# Patient Record
Sex: FEMALE | Race: WHITE | NOT HISPANIC OR LATINO | Employment: OTHER | URBAN - METROPOLITAN AREA
[De-identification: names, ages, dates, MRNs, and addresses within clinical notes are randomized per-mention and may not be internally consistent; named-entity substitution may affect disease eponyms.]

---

## 2017-02-28 ENCOUNTER — RECORDS - HEALTHEAST (OUTPATIENT)
Dept: LAB | Facility: CLINIC | Age: 77
End: 2017-02-28

## 2017-02-28 LAB
CHOLEST SERPL-MCNC: 159 MG/DL
FASTING STATUS PATIENT QL REPORTED: ABNORMAL
HDLC SERPL-MCNC: 45 MG/DL
LDLC SERPL CALC-MCNC: 77 MG/DL
TRIGL SERPL-MCNC: 187 MG/DL

## 2017-05-30 ENCOUNTER — HOSPITAL ENCOUNTER (OUTPATIENT)
Dept: LAB | Age: 77
Setting detail: SPECIMEN
Discharge: HOME | End: 2017-05-30

## 2017-08-31 ENCOUNTER — RECORDS - HEALTHEAST (OUTPATIENT)
Dept: LAB | Facility: CLINIC | Age: 77
End: 2017-08-31

## 2017-09-01 LAB
CHOLEST SERPL-MCNC: 159 MG/DL
FASTING STATUS PATIENT QL REPORTED: ABNORMAL
HDLC SERPL-MCNC: 46 MG/DL
LDLC SERPL CALC-MCNC: 95 MG/DL
TRIGL SERPL-MCNC: 90 MG/DL

## 2017-12-06 ENCOUNTER — RECORDS - HEALTHEAST (OUTPATIENT)
Dept: LAB | Facility: CLINIC | Age: 77
End: 2017-12-06

## 2017-12-06 LAB
CHOLEST SERPL-MCNC: 168 MG/DL
FASTING STATUS PATIENT QL REPORTED: ABNORMAL
HDLC SERPL-MCNC: 54 MG/DL
LDLC SERPL CALC-MCNC: 82 MG/DL
TRIGL SERPL-MCNC: 160 MG/DL

## 2018-03-07 ENCOUNTER — RECORDS - HEALTHEAST (OUTPATIENT)
Dept: LAB | Facility: CLINIC | Age: 78
End: 2018-03-07

## 2018-03-07 LAB
ALBUMIN SERPL-MCNC: 3.3 G/DL (ref 3.5–5)
ALP SERPL-CCNC: 122 U/L (ref 45–120)
ALT SERPL W P-5'-P-CCNC: 31 U/L (ref 0–45)
ANION GAP SERPL CALCULATED.3IONS-SCNC: 9 MMOL/L (ref 5–18)
AST SERPL W P-5'-P-CCNC: 38 U/L (ref 0–40)
BILIRUB SERPL-MCNC: 0.5 MG/DL (ref 0–1)
BUN SERPL-MCNC: 15 MG/DL (ref 8–28)
CALCIUM SERPL-MCNC: 9 MG/DL (ref 8.5–10.5)
CHLORIDE BLD-SCNC: 101 MMOL/L (ref 98–107)
CO2 SERPL-SCNC: 29 MMOL/L (ref 22–31)
CREAT SERPL-MCNC: 0.84 MG/DL (ref 0.6–1.1)
GFR SERPL CREATININE-BSD FRML MDRD: >60 ML/MIN/1.73M2
GLUCOSE BLD-MCNC: 218 MG/DL (ref 70–125)
POTASSIUM BLD-SCNC: 4.2 MMOL/L (ref 3.5–5)
PROT SERPL-MCNC: 7 G/DL (ref 6–8)
SODIUM SERPL-SCNC: 139 MMOL/L (ref 136–145)

## 2018-06-08 ENCOUNTER — RECORDS - HEALTHEAST (OUTPATIENT)
Dept: LAB | Facility: CLINIC | Age: 78
End: 2018-06-08

## 2018-06-08 LAB
ANION GAP SERPL CALCULATED.3IONS-SCNC: 12 MMOL/L (ref 5–18)
BUN SERPL-MCNC: 16 MG/DL (ref 8–28)
CALCIUM SERPL-MCNC: 9.3 MG/DL (ref 8.5–10.5)
CHLORIDE BLD-SCNC: 104 MMOL/L (ref 98–107)
CHOLEST SERPL-MCNC: 175 MG/DL
CO2 SERPL-SCNC: 27 MMOL/L (ref 22–31)
CREAT SERPL-MCNC: 0.84 MG/DL (ref 0.6–1.1)
FASTING STATUS PATIENT QL REPORTED: NORMAL
GFR SERPL CREATININE-BSD FRML MDRD: >60 ML/MIN/1.73M2
GLUCOSE BLD-MCNC: 171 MG/DL (ref 70–125)
HDLC SERPL-MCNC: 50 MG/DL
LDLC SERPL CALC-MCNC: 96 MG/DL
POTASSIUM BLD-SCNC: 4.2 MMOL/L (ref 3.5–5)
SODIUM SERPL-SCNC: 143 MMOL/L (ref 136–145)
TRIGL SERPL-MCNC: 145 MG/DL

## 2018-09-11 ENCOUNTER — RECORDS - HEALTHEAST (OUTPATIENT)
Dept: LAB | Facility: CLINIC | Age: 78
End: 2018-09-11

## 2018-09-11 LAB
ALBUMIN SERPL-MCNC: 3.5 G/DL (ref 3.5–5)
ALP SERPL-CCNC: 120 U/L (ref 45–120)
ALT SERPL W P-5'-P-CCNC: 21 U/L (ref 0–45)
ANION GAP SERPL CALCULATED.3IONS-SCNC: 11 MMOL/L (ref 5–18)
AST SERPL W P-5'-P-CCNC: 25 U/L (ref 0–40)
BILIRUB SERPL-MCNC: 0.4 MG/DL (ref 0–1)
BUN SERPL-MCNC: 14 MG/DL (ref 8–28)
CALCIUM SERPL-MCNC: 9.2 MG/DL (ref 8.5–10.5)
CHLORIDE BLD-SCNC: 103 MMOL/L (ref 98–107)
CHOLEST SERPL-MCNC: 160 MG/DL
CO2 SERPL-SCNC: 26 MMOL/L (ref 22–31)
CREAT SERPL-MCNC: 0.84 MG/DL (ref 0.6–1.1)
FASTING STATUS PATIENT QL REPORTED: ABNORMAL
GFR SERPL CREATININE-BSD FRML MDRD: >60 ML/MIN/1.73M2
GLUCOSE BLD-MCNC: 151 MG/DL (ref 70–125)
HDLC SERPL-MCNC: 47 MG/DL
LDLC SERPL CALC-MCNC: 85 MG/DL
POTASSIUM BLD-SCNC: 4.1 MMOL/L (ref 3.5–5)
PROT SERPL-MCNC: 6.9 G/DL (ref 6–8)
SODIUM SERPL-SCNC: 140 MMOL/L (ref 136–145)
T4 FREE SERPL-MCNC: 1 NG/DL (ref 0.7–1.8)
TRIGL SERPL-MCNC: 140 MG/DL
TSH SERPL DL<=0.005 MIU/L-ACNC: 5.59 UIU/ML (ref 0.3–5)
URATE SERPL-MCNC: 5.2 MG/DL (ref 2–7.5)

## 2018-12-14 ENCOUNTER — RECORDS - HEALTHEAST (OUTPATIENT)
Dept: LAB | Facility: CLINIC | Age: 78
End: 2018-12-14

## 2018-12-14 LAB
ALBUMIN SERPL-MCNC: 3.5 G/DL (ref 3.5–5)
ALP SERPL-CCNC: 125 U/L (ref 45–120)
ALT SERPL W P-5'-P-CCNC: 20 U/L (ref 0–45)
ANION GAP SERPL CALCULATED.3IONS-SCNC: 11 MMOL/L (ref 5–18)
AST SERPL W P-5'-P-CCNC: 23 U/L (ref 0–40)
BILIRUB SERPL-MCNC: 0.6 MG/DL (ref 0–1)
BUN SERPL-MCNC: 12 MG/DL (ref 8–28)
CALCIUM SERPL-MCNC: 9.5 MG/DL (ref 8.5–10.5)
CHLORIDE BLD-SCNC: 101 MMOL/L (ref 98–107)
CHOLEST SERPL-MCNC: 166 MG/DL
CO2 SERPL-SCNC: 30 MMOL/L (ref 22–31)
CREAT SERPL-MCNC: 0.84 MG/DL (ref 0.6–1.1)
FASTING STATUS PATIENT QL REPORTED: ABNORMAL
GFR SERPL CREATININE-BSD FRML MDRD: >60 ML/MIN/1.73M2
GLUCOSE BLD-MCNC: 156 MG/DL (ref 70–125)
HDLC SERPL-MCNC: 50 MG/DL
LDLC SERPL CALC-MCNC: 85 MG/DL
POTASSIUM BLD-SCNC: 4.2 MMOL/L (ref 3.5–5)
PROT SERPL-MCNC: 7.1 G/DL (ref 6–8)
SODIUM SERPL-SCNC: 142 MMOL/L (ref 136–145)
TRIGL SERPL-MCNC: 156 MG/DL
TSH SERPL DL<=0.005 MIU/L-ACNC: 4.4 UIU/ML (ref 0.3–5)

## 2019-03-19 ENCOUNTER — RECORDS - HEALTHEAST (OUTPATIENT)
Dept: LAB | Facility: CLINIC | Age: 79
End: 2019-03-19

## 2019-03-19 LAB
ALBUMIN SERPL-MCNC: 3.4 G/DL (ref 3.5–5)
ALP SERPL-CCNC: 117 U/L (ref 45–120)
ALT SERPL W P-5'-P-CCNC: 22 U/L (ref 0–45)
ANION GAP SERPL CALCULATED.3IONS-SCNC: 10 MMOL/L (ref 5–18)
AST SERPL W P-5'-P-CCNC: 27 U/L (ref 0–40)
BILIRUB SERPL-MCNC: 0.5 MG/DL (ref 0–1)
BUN SERPL-MCNC: 14 MG/DL (ref 8–28)
CALCIUM SERPL-MCNC: 9.2 MG/DL (ref 8.5–10.5)
CHLORIDE BLD-SCNC: 103 MMOL/L (ref 98–107)
CO2 SERPL-SCNC: 29 MMOL/L (ref 22–31)
CREAT SERPL-MCNC: 0.79 MG/DL (ref 0.6–1.1)
GFR SERPL CREATININE-BSD FRML MDRD: >60 ML/MIN/1.73M2
GLUCOSE BLD-MCNC: 147 MG/DL (ref 70–125)
POTASSIUM BLD-SCNC: 4.3 MMOL/L (ref 3.5–5)
PROT SERPL-MCNC: 7 G/DL (ref 6–8)
SODIUM SERPL-SCNC: 142 MMOL/L (ref 136–145)
URATE SERPL-MCNC: 3.9 MG/DL (ref 2–7.5)

## 2019-06-20 ENCOUNTER — RECORDS - HEALTHEAST (OUTPATIENT)
Dept: LAB | Facility: CLINIC | Age: 79
End: 2019-06-20

## 2019-06-20 LAB
ALBUMIN SERPL-MCNC: 3.3 G/DL (ref 3.5–5)
ALP SERPL-CCNC: 120 U/L (ref 45–120)
ALT SERPL W P-5'-P-CCNC: 24 U/L (ref 0–45)
ANION GAP SERPL CALCULATED.3IONS-SCNC: 10 MMOL/L (ref 5–18)
AST SERPL W P-5'-P-CCNC: 25 U/L (ref 0–40)
BILIRUB SERPL-MCNC: 0.3 MG/DL (ref 0–1)
BUN SERPL-MCNC: 16 MG/DL (ref 8–28)
CALCIUM SERPL-MCNC: 9.3 MG/DL (ref 8.5–10.5)
CHLORIDE BLD-SCNC: 103 MMOL/L (ref 98–107)
CHOLEST SERPL-MCNC: 158 MG/DL
CO2 SERPL-SCNC: 26 MMOL/L (ref 22–31)
CREAT SERPL-MCNC: 0.87 MG/DL (ref 0.6–1.1)
FASTING STATUS PATIENT QL REPORTED: NORMAL
GFR SERPL CREATININE-BSD FRML MDRD: >60 ML/MIN/1.73M2
GLUCOSE BLD-MCNC: 222 MG/DL (ref 70–125)
HDLC SERPL-MCNC: 51 MG/DL
LDLC SERPL CALC-MCNC: 78 MG/DL
POTASSIUM BLD-SCNC: 4.2 MMOL/L (ref 3.5–5)
PROT SERPL-MCNC: 6.8 G/DL (ref 6–8)
SODIUM SERPL-SCNC: 139 MMOL/L (ref 136–145)
TRIGL SERPL-MCNC: 143 MG/DL
TSH SERPL DL<=0.005 MIU/L-ACNC: 3.6 UIU/ML (ref 0.3–5)

## 2019-09-24 ENCOUNTER — RECORDS - HEALTHEAST (OUTPATIENT)
Dept: LAB | Facility: CLINIC | Age: 79
End: 2019-09-24

## 2019-09-24 LAB
ALBUMIN SERPL-MCNC: 3.3 G/DL (ref 3.5–5)
ALP SERPL-CCNC: 114 U/L (ref 45–120)
ALT SERPL W P-5'-P-CCNC: 23 U/L (ref 0–45)
ANION GAP SERPL CALCULATED.3IONS-SCNC: 9 MMOL/L (ref 5–18)
AST SERPL W P-5'-P-CCNC: 24 U/L (ref 0–40)
BILIRUB SERPL-MCNC: 0.4 MG/DL (ref 0–1)
BUN SERPL-MCNC: 14 MG/DL (ref 8–28)
CALCIUM SERPL-MCNC: 8.8 MG/DL (ref 8.5–10.5)
CHLORIDE BLD-SCNC: 104 MMOL/L (ref 98–107)
CHOLEST SERPL-MCNC: 152 MG/DL
CO2 SERPL-SCNC: 28 MMOL/L (ref 22–31)
CREAT SERPL-MCNC: 0.88 MG/DL (ref 0.6–1.1)
FASTING STATUS PATIENT QL REPORTED: ABNORMAL
GFR SERPL CREATININE-BSD FRML MDRD: >60 ML/MIN/1.73M2
GLUCOSE BLD-MCNC: 215 MG/DL (ref 70–125)
HDLC SERPL-MCNC: 52 MG/DL
LDLC SERPL CALC-MCNC: 68 MG/DL
POTASSIUM BLD-SCNC: 4 MMOL/L (ref 3.5–5)
PROT SERPL-MCNC: 6.7 G/DL (ref 6–8)
SODIUM SERPL-SCNC: 141 MMOL/L (ref 136–145)
T4 FREE SERPL-MCNC: 0.9 NG/DL (ref 0.7–1.8)
TRIGL SERPL-MCNC: 160 MG/DL
TSH SERPL DL<=0.005 MIU/L-ACNC: 7.52 UIU/ML (ref 0.3–5)

## 2020-01-02 ENCOUNTER — RECORDS - HEALTHEAST (OUTPATIENT)
Dept: LAB | Facility: CLINIC | Age: 80
End: 2020-01-02

## 2020-01-02 LAB
ALBUMIN SERPL-MCNC: 3.4 G/DL (ref 3.5–5)
ALP SERPL-CCNC: 126 U/L (ref 45–120)
ALT SERPL W P-5'-P-CCNC: 26 U/L (ref 0–45)
ANION GAP SERPL CALCULATED.3IONS-SCNC: 12 MMOL/L (ref 5–18)
AST SERPL W P-5'-P-CCNC: 32 U/L (ref 0–40)
BILIRUB SERPL-MCNC: 0.6 MG/DL (ref 0–1)
BUN SERPL-MCNC: 16 MG/DL (ref 8–28)
CALCIUM SERPL-MCNC: 9.3 MG/DL (ref 8.5–10.5)
CHLORIDE BLD-SCNC: 102 MMOL/L (ref 98–107)
CHOLEST SERPL-MCNC: 169 MG/DL
CO2 SERPL-SCNC: 26 MMOL/L (ref 22–31)
CREAT SERPL-MCNC: 1.02 MG/DL (ref 0.6–1.1)
FASTING STATUS PATIENT QL REPORTED: ABNORMAL
GFR SERPL CREATININE-BSD FRML MDRD: 53 ML/MIN/1.73M2
GLUCOSE BLD-MCNC: 210 MG/DL (ref 70–125)
HDLC SERPL-MCNC: 46 MG/DL
LDLC SERPL CALC-MCNC: 72 MG/DL
POTASSIUM BLD-SCNC: 4.1 MMOL/L (ref 3.5–5)
PROT SERPL-MCNC: 7.2 G/DL (ref 6–8)
SODIUM SERPL-SCNC: 140 MMOL/L (ref 136–145)
T4 FREE SERPL-MCNC: 1 NG/DL (ref 0.7–1.8)
TRIGL SERPL-MCNC: 255 MG/DL
TSH SERPL DL<=0.005 MIU/L-ACNC: 5.11 UIU/ML (ref 0.3–5)

## 2020-04-03 ENCOUNTER — RECORDS - HEALTHEAST (OUTPATIENT)
Dept: LAB | Facility: CLINIC | Age: 80
End: 2020-04-03

## 2020-04-03 LAB
ANION GAP SERPL CALCULATED.3IONS-SCNC: 11 MMOL/L (ref 5–18)
BUN SERPL-MCNC: 13 MG/DL (ref 8–28)
CALCIUM SERPL-MCNC: 8.7 MG/DL (ref 8.5–10.5)
CHLORIDE BLD-SCNC: 101 MMOL/L (ref 98–107)
CO2 SERPL-SCNC: 28 MMOL/L (ref 22–31)
CREAT SERPL-MCNC: 1.03 MG/DL (ref 0.6–1.1)
GFR SERPL CREATININE-BSD FRML MDRD: 52 ML/MIN/1.73M2
GLUCOSE BLD-MCNC: 275 MG/DL (ref 70–125)
POTASSIUM BLD-SCNC: 4 MMOL/L (ref 3.5–5)
SODIUM SERPL-SCNC: 140 MMOL/L (ref 136–145)
T4 FREE SERPL-MCNC: 1 NG/DL (ref 0.7–1.8)
TSH SERPL DL<=0.005 MIU/L-ACNC: 6.16 UIU/ML (ref 0.3–5)

## 2020-04-04 LAB — BACTERIA SPEC CULT: NO GROWTH

## 2020-06-04 ENCOUNTER — RECORDS - HEALTHEAST (OUTPATIENT)
Dept: LAB | Facility: CLINIC | Age: 80
End: 2020-06-04

## 2020-06-04 LAB
ANION GAP SERPL CALCULATED.3IONS-SCNC: 12 MMOL/L (ref 5–18)
BUN SERPL-MCNC: 16 MG/DL (ref 8–28)
CALCIUM SERPL-MCNC: 9 MG/DL (ref 8.5–10.5)
CHLORIDE BLD-SCNC: 105 MMOL/L (ref 98–107)
CO2 SERPL-SCNC: 24 MMOL/L (ref 22–31)
CREAT SERPL-MCNC: 0.94 MG/DL (ref 0.6–1.1)
GFR SERPL CREATININE-BSD FRML MDRD: 58 ML/MIN/1.73M2
GLUCOSE BLD-MCNC: 150 MG/DL (ref 70–125)
POTASSIUM BLD-SCNC: 4.4 MMOL/L (ref 3.5–5)
SODIUM SERPL-SCNC: 141 MMOL/L (ref 136–145)
TSH SERPL DL<=0.005 MIU/L-ACNC: 2.12 UIU/ML (ref 0.3–5)

## 2020-12-15 ENCOUNTER — VIRTUAL VISIT (OUTPATIENT)
Dept: NEUROLOGY | Facility: CLINIC | Age: 80
End: 2020-12-15
Payer: MEDICARE

## 2020-12-15 VITALS — BODY MASS INDEX: 30.73 KG/M2 | HEIGHT: 64 IN | WEIGHT: 180 LBS

## 2020-12-15 DIAGNOSIS — G40.909 SEIZURE DISORDER (H): Primary | ICD-10-CM

## 2020-12-15 PROCEDURE — 99442 PR PHYSICIAN TELEPHONE EVALUATION 11-20 MIN: CPT | Performed by: PSYCHIATRY & NEUROLOGY

## 2020-12-15 RX ORDER — CLINDAMYCIN PHOSPHATE 10 UG/ML
LOTION TOPICAL DAILY PRN
COMMUNITY
Start: 2020-08-05

## 2020-12-15 RX ORDER — ALLOPURINOL 100 MG/1
1 TABLET ORAL 2 TIMES DAILY
COMMUNITY
Start: 2020-10-26

## 2020-12-15 RX ORDER — LAMOTRIGINE 100 MG/1
100 TABLET ORAL 2 TIMES DAILY
Qty: 180 TABLET | Refills: 3 | Status: SHIPPED | OUTPATIENT
Start: 2020-12-15 | End: 2021-12-13

## 2020-12-15 RX ORDER — ATORVASTATIN CALCIUM 40 MG/1
40 TABLET, FILM COATED ORAL AT BEDTIME
COMMUNITY
Start: 2020-09-03

## 2020-12-15 RX ORDER — LAMOTRIGINE 100 MG/1
1 TABLET ORAL 2 TIMES DAILY
COMMUNITY
Start: 2020-08-17 | End: 2020-12-15

## 2020-12-15 RX ORDER — LEVOTHYROXINE SODIUM 75 UG/1
75 TABLET ORAL DAILY
COMMUNITY
Start: 2020-05-01 | End: 2023-05-10

## 2020-12-15 RX ORDER — TRIAMCINOLONE ACETONIDE 1 MG/G
CREAM TOPICAL PRN
COMMUNITY
Start: 2020-06-15 | End: 2023-05-10

## 2020-12-15 RX ORDER — HYDROCHLOROTHIAZIDE 25 MG/1
12.5 TABLET ORAL DAILY
COMMUNITY
Start: 2020-10-17

## 2020-12-15 RX ORDER — VALSARTAN 160 MG/1
1 TABLET ORAL DAILY
COMMUNITY
Start: 2020-12-01 | End: 2023-05-10

## 2020-12-15 RX ORDER — INSULIN GLARGINE 100 [IU]/ML
30 INJECTION, SOLUTION SUBCUTANEOUS AT BEDTIME
COMMUNITY
Start: 2020-08-28 | End: 2024-08-13 | Stop reason: ALTCHOICE

## 2020-12-15 SDOH — HEALTH STABILITY: MENTAL HEALTH: HOW OFTEN DO YOU HAVE A DRINK CONTAINING ALCOHOL?: NEVER

## 2020-12-15 ASSESSMENT — MIFFLIN-ST. JEOR: SCORE: 1281.47

## 2020-12-15 NOTE — LETTER
12/15/2020         RE: Anjelica Rajan  [address]  [place] [address]        Dear Colleague,    Thank you for referring your patient, Anjelica Rajan, to the [facility] [place]. Please see a copy of my visit note below.    [facility]  [place]    Anjelica Rajan MRN# [id number]   Age: 80 year old YOB: 1940               Assessment and Plan:   Assessment:   Right temporal lobe epilepsy        Plan:     I renewed the lamotrigine through her mail order pharmacy electronically.  We will plan to meet again in a year, sooner if need be.             Chief Complaint/HPI:     I spoke to her niece on the telephone for a telephone visit with her permission today.  She is 80 now, she has a history of seizures coming from the right temporal lobe.  I think it is been at least 10 years since she had a seizure (episodes of left-sided numbness).  She continues on lamotrigine 100 mg twice a day, no side effects from that.  She does mention that it might make her a little more mellow, but it is not causing the fatigue that she had with Keppra previously.            Past Medical History:    has a past medical history of Seizures (H).          Past Surgical History:    has no past surgical history on file.          Social History:     Social History     Tobacco Use     Smoking status: Never Smoker     Smokeless tobacco: Never Used   Substance Use Topics     Alcohol use: Never     Frequency: Never             Family History:     Family History   Problem Relation Age of Onset     Heart Disease Father                 Allergies:     Allergies   Allergen Reactions     Metformin Diarrhea             Medications:     Current Outpatient Medications:      allopurinol (ZYLOPRIM) 100 MG tablet, Take 1 tablet by mouth 2 times daily, Disp: , Rfl:      ASPIRIN 81 PO, Take by mouth At Bedtime, Disp: , Rfl:      atorvastatin (LIPITOR) 40 MG tablet, Take 1 tablet by mouth daily, Disp:  , Rfl:      CALCIUM-VITAMIN D PO, Take by mouth daily, Disp: , Rfl:      clindamycin (CLEOCIN T) 1 % external lotion, as needed, Disp: , Rfl:      DIOVAN 160 MG tablet, Take 1 tablet by mouth daily, Disp: , Rfl:      GLUCOSAMINE-CHONDROITIN PO, Take by mouth daily, Disp: , Rfl:      hydrochlorothiazide (HYDRODIURIL) 25 MG tablet, Take 12.5 mg by mouth daily, Disp: , Rfl:      lamoTRIgine (LAMICTAL) 100 MG tablet, Take 1 tablet (100 mg) by mouth 2 times daily, Disp: 180 tablet, Rfl: 3     LANTUS SOLOSTAR 100 UNIT/ML soln, , Disp: , Rfl:      levothyroxine (SYNTHROID/LEVOTHROID) 75 MCG tablet, Take 75 mcg by mouth daily, Disp: , Rfl:      MULTIPLE VITAMIN PO, Take by mouth daily, Disp: , Rfl:      omeprazole (PRILOSEC) 20 MG DR capsule, Take 1 capsule by mouth daily, Disp: , Rfl:      triamcinolone (KENALOG) 0.1 % external cream, as needed, Disp: , Rfl:            Review of Systems:   No difficulty breathing or swallowing            Physical Exam:   Awake and alert with no aphasia no dysarthria  Speech is clear and coherent  Cranial nerves are fine  I do not see any focal or lateralized weakness or coordination difficulties in the arms  Gait looks steady here on the video.        13 minutes were spent on the phone today      Lai Goodrich MD             Again, thank you for allowing me to participate in the care of your patient.        Sincerely,        Lai Goodrich MD

## 2020-12-15 NOTE — PATIENT INSTRUCTIONS
Patient Education     Diagnosing Epilepsy    Your primary healthcare provider may be the first healthcare provider to evaluate you for epilepsy. He or she may then refer you to a specialist for further evaluation. This specialist may be a neurologist (a healthcare provider who treats the brain), or an epileptologist, a neurologist who specializes in seizure disorders. Your evaluation will include a health history, physical and neurologic exams, and tests. An epilepsy diagnosis is typically made when someone has more than 1 seizure that occurs for no apparent reason (an unprovoked seizure).  Health history  This is the most important part of your evaluation. The healthcare provider will ask you to describe your seizures. The healthcare provider may also want to talk to family or friends who have seen your seizures. In addition, your healthcare provider will ask about your risk factors. These are things that make you more likely to have epilepsy, and include:    Being born before your due date (premature birth)    Oxygen deprivation during birth     A family history of epilepsy    Past nervous system infection, like meningitis    A previous head or brain injury    Past stroke or brain tumor    A history of febrile seizures (childhood seizures caused by high fever)    Use of illegal drugs or alcohol    Certain genetic disorders    Alcohol abuse or withdrawal    Alzheimer disease    Gluten intolerance or celiac disease    Hydrocephalus or an abnormal buildup of fluid around the brain    Withdrawal of antiepileptic medicines, even when they are used for other conditions, such as gabapentin for pain   Physical and neurologic exams  The physical exam checks your overall health. Your pulse, blood pressure, and temperature are taken. The neurologic exam checks certain functions of your brain. These include reflexes, balance, muscle strength, and coordination. Mental skills, like language and memory, and nerve function of  the body are also checked.  Tests for epilepsy  After the exams are done, the healthcare provider may order some tests. Electroencephalogram (EEG) and MRI are the most common tests used to support a diagnosis of epilepsy.  Electroencephalogram (EEG)  An EEG records electrical activity in the brain. It can show abnormal signals that may mean seizure activity. In some cases, it can point to the area of the brain where seizures might start.  Imaging tests  Imaging tests may be used to create detailed pictures of the brain. These tests include MRI and CT scans.  Blood tests and other tests  You may have a sample of blood taken and tested. Other tests may also be done. These tests can help rule out certain health problems or provide more information.  [employer] last reviewed this educational content on 11/1/2017 2000-2020 [employer] [address] [place] [address], [place] [address]. All rights reserved. This information is not intended as a substitute for professional medical care. Always follow your healthcare professional's instructions.

## 2020-12-21 ENCOUNTER — RECORDS - HEALTHEAST (OUTPATIENT)
Dept: LAB | Facility: CLINIC | Age: 80
End: 2020-12-21

## 2020-12-21 LAB
ANION GAP SERPL CALCULATED.3IONS-SCNC: 11 MMOL/L (ref 5–18)
BUN SERPL-MCNC: 19 MG/DL (ref 8–28)
CALCIUM SERPL-MCNC: 9.1 MG/DL (ref 8.5–10.5)
CHLORIDE BLD-SCNC: 103 MMOL/L (ref 98–107)
CHOLEST SERPL-MCNC: 172 MG/DL
CO2 SERPL-SCNC: 28 MMOL/L (ref 22–31)
CREAT SERPL-MCNC: 0.85 MG/DL (ref 0.6–1.1)
FASTING STATUS PATIENT QL REPORTED: ABNORMAL
GFR SERPL CREATININE-BSD FRML MDRD: >60 ML/MIN/1.73M2
GLUCOSE BLD-MCNC: 195 MG/DL (ref 70–125)
HDLC SERPL-MCNC: 51 MG/DL
LDLC SERPL CALC-MCNC: 81 MG/DL
POTASSIUM BLD-SCNC: 4.7 MMOL/L (ref 3.5–5)
SODIUM SERPL-SCNC: 142 MMOL/L (ref 136–145)
TRIGL SERPL-MCNC: 198 MG/DL

## 2021-05-26 ENCOUNTER — RECORDS - HEALTHEAST (OUTPATIENT)
Dept: ADMINISTRATIVE | Facility: CLINIC | Age: 81
End: 2021-05-26

## 2021-05-31 ENCOUNTER — RECORDS - HEALTHEAST (OUTPATIENT)
Dept: ADMINISTRATIVE | Facility: CLINIC | Age: 81
End: 2021-05-31

## 2021-06-23 ENCOUNTER — RECORDS - HEALTHEAST (OUTPATIENT)
Dept: LAB | Facility: CLINIC | Age: 81
End: 2021-06-23

## 2021-06-23 LAB
ANION GAP SERPL CALCULATED.3IONS-SCNC: 14 MMOL/L (ref 5–18)
BUN SERPL-MCNC: 14 MG/DL (ref 8–28)
CALCIUM SERPL-MCNC: 8.8 MG/DL (ref 8.5–10.5)
CHLORIDE BLD-SCNC: 100 MMOL/L (ref 98–107)
CO2 SERPL-SCNC: 26 MMOL/L (ref 22–31)
CREAT SERPL-MCNC: 1.02 MG/DL (ref 0.6–1.1)
GFR SERPL CREATININE-BSD FRML MDRD: 52 ML/MIN/1.73M2
GLUCOSE BLD-MCNC: 206 MG/DL (ref 70–125)
POTASSIUM BLD-SCNC: 4.4 MMOL/L (ref 3.5–5)
SODIUM SERPL-SCNC: 140 MMOL/L (ref 136–145)
TSH SERPL DL<=0.005 MIU/L-ACNC: 4.61 UIU/ML (ref 0.3–5)

## 2021-06-24 LAB — BACTERIA SPEC CULT: NORMAL

## 2021-07-23 ENCOUNTER — RECORDS - HEALTHEAST (OUTPATIENT)
Dept: ADMINISTRATIVE | Facility: CLINIC | Age: 81
End: 2021-07-23

## 2021-09-27 ENCOUNTER — LAB REQUISITION (OUTPATIENT)
Dept: LAB | Facility: CLINIC | Age: 81
End: 2021-09-27
Payer: MEDICARE

## 2021-09-27 DIAGNOSIS — J02.9 ACUTE PHARYNGITIS, UNSPECIFIED: ICD-10-CM

## 2021-09-27 PROCEDURE — 87081 CULTURE SCREEN ONLY: CPT | Mod: ORL | Performed by: PHYSICIAN ASSISTANT

## 2021-09-30 LAB — BACTERIA SPEC CULT: NORMAL

## 2021-12-10 DIAGNOSIS — G40.909 SEIZURE DISORDER (H): ICD-10-CM

## 2021-12-13 RX ORDER — LAMOTRIGINE 100 MG/1
TABLET ORAL
Qty: 180 TABLET | Refills: 3 | Status: SHIPPED | OUTPATIENT
Start: 2021-12-13 | End: 2021-12-23

## 2021-12-13 NOTE — TELEPHONE ENCOUNTER
Medication refill request for lamoTRIgine (LAMICTAL) 100 MG tablet.    Patient's next appointment is scheduled on December 23, 2021.      Medication T'd for review and signature    ELAINE Ramirez on 12/13/2021 at 10:19 AM

## 2021-12-23 ENCOUNTER — OFFICE VISIT (OUTPATIENT)
Dept: NEUROLOGY | Facility: CLINIC | Age: 81
End: 2021-12-23
Payer: MEDICARE

## 2021-12-23 VITALS
BODY MASS INDEX: 34.49 KG/M2 | HEART RATE: 79 BPM | SYSTOLIC BLOOD PRESSURE: 161 MMHG | HEIGHT: 64 IN | DIASTOLIC BLOOD PRESSURE: 85 MMHG | WEIGHT: 202 LBS

## 2021-12-23 DIAGNOSIS — G40.109 SIMPLE PARTIAL SEIZURE DISORDER (H): Primary | ICD-10-CM

## 2021-12-23 PROBLEM — N18.31 CHRONIC KIDNEY DISEASE, STAGE 3A (H): Status: ACTIVE | Noted: 2021-12-23

## 2021-12-23 PROBLEM — E78.2 MIXED HYPERLIPIDEMIA: Status: ACTIVE | Noted: 2021-12-23

## 2021-12-23 PROBLEM — E11.9 TYPE 2 DIABETES MELLITUS WITHOUT COMPLICATION (H): Status: ACTIVE | Noted: 2021-12-23

## 2021-12-23 PROBLEM — Z85.820 H/O MALIGNANT MELANOMA: Status: ACTIVE | Noted: 2021-12-23

## 2021-12-23 PROBLEM — E03.9 ACQUIRED HYPOTHYROIDISM: Status: ACTIVE | Noted: 2021-12-23

## 2021-12-23 PROBLEM — G40.209 NONINTRACTABLE EPILEPSY WITH COMPLEX PARTIAL SEIZURES (H): Status: ACTIVE | Noted: 2021-12-23

## 2021-12-23 PROCEDURE — 99215 OFFICE O/P EST HI 40 MIN: CPT | Performed by: PSYCHIATRY & NEUROLOGY

## 2021-12-23 RX ORDER — LAMOTRIGINE 100 MG/1
100 TABLET ORAL 2 TIMES DAILY
Qty: 180 TABLET | Refills: 3 | Status: SHIPPED | OUTPATIENT
Start: 2021-12-23 | End: 2022-12-27

## 2021-12-23 ASSESSMENT — MIFFLIN-ST. JEOR: SCORE: 1376.27

## 2021-12-23 NOTE — LETTER
2021         RE: Anjelica Rajan  [address]  [place] [address]        Dear Colleague,    Thank you for referring your patient, Anjelica Rajan, to the [facility] [place]. Please see a copy of my visit note below.    NEUROLOGY FOLLOW UP VISIT  NOTE       [facility] [place]  [address] [place] [address]  Tel: [phone]  Fax: [phone]  [email or web address]     Anjelica Rajan,  1940, MRN [id number]  PCP: Chilo Betts  Date: 2021      ASSESSMENT & PLAN     Visit Diagnosis  1. Simple partial seizure     Simple partial seizure  81-year-old female with HTN, HLD, DM who is been followed in our clinic for simple partial seizure.  Previously she was on Keppra but ever since she was switched to lamotrigine her symptoms have remained well controlled on low-dose.  I have recommended:    1.  Continue lamotrigine 100 mg twice daily.  I refilled her prescription, gave her 90-day supply with 3 refills  2.  Check lamotrigine level and hepatic profile  3.  Follow-up will be in 1 year    Thank you again for this referral, please feel free to contact me if you have any questions.    Kevyn Otero MD  [facility]  (Formerly, [facility]     HISTORY OF PRESENT ILLNESS     Patient is a 81-year-old female with history of HTN, HLD, DM and simple partial seizures previously followed by Dr. Goodrich who returns for yearly follow-up.  She is a new patient for me.    Patient was initially evaluated in our clinic in  when she reported episodes of left sided weakness and facial numbness.  Initially she was worked up for transient ischemic attack and had MRI of the head and MRA that was normal.  However, EEG showed right temporal sharp activity and she was diagnosed with focal seizures.  She was initially on Keppra and subsequently switched to lamotrigine in .  This was done as he  was feeling fatigued on Keppra.  According to patient the seizures during this episode she never loses consciousness.  She gets numbness tingling on the left side.  She has been symptom-free for more than 6 years.     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    Surgical History:  She  has a past surgical history that includes IR Renal Angiogram Bilateral (11/26/2002).     SOCIAL HISTORY     Reviewed, and she  reports that she has never smoked. She has never used smokeless tobacco. She reports that she does not drink alcohol and does not use drugs.     FAMILY HISTORY     Reviewed, and family history includes Heart Disease in her father.     ALLERGIES     Allergies   Allergen Reactions     Glipizide      Other reaction(s): dizziness     Metformin Diarrhea         REVIEW OF SYSTEMS     A 12 point review of system was performed and was negative except as outlined in the history of present illness.     HOME MEDICATIONS     Current Outpatient Rx   Medication Sig Dispense Refill     allopurinol (ZYLOPRIM) 100 MG tablet Take 1 tablet by mouth 2 times daily       ASPIRIN 81 PO Take by mouth At Bedtime       atorvastatin (LIPITOR) 40 MG tablet Take 1 tablet by mouth daily       CALCIUM-VITAMIN D PO Take by mouth daily       clindamycin (CLEOCIN T) 1 % external lotion as needed       DIOVAN 160 MG tablet Take 1 tablet by mouth daily       GLUCOSAMINE-CHONDROITIN PO Take by mouth daily       hydrochlorothiazide (HYDRODIURIL) 25 MG tablet Take 12.5 mg by mouth daily       lamoTRIgine (LAMICTAL) 100 MG tablet  "Take 1 tablet (100 mg) by mouth 2 times daily 180 tablet 3     LANTUS SOLOSTAR 100 UNIT/ML soln        levothyroxine (SYNTHROID/LEVOTHROID) 75 MCG tablet Take 75 mcg by mouth daily       MULTIPLE VITAMIN PO Take by mouth daily       omeprazole (PRILOSEC) 20 MG DR capsule Take 1 capsule by mouth daily       triamcinolone (KENALOG) 0.1 % external cream as needed           PHYSICAL EXAM     Vital signs  BP (!) 161/85 (BP Location: Left arm, Patient Position: Sitting)   Pulse 79   Ht 1.626 m (5' 4\")   Wt 91.6 kg (202 lb)   BMI 34.67 kg/m      Weight:   202 lbs 0 oz    Patient is alert and oriented x4 in no acute distress. Vital signs were reviewed and are documented in electronic medical record. Neck was supple, no carotid bruits, thyromegaly, JVD, or lymphadenopathy was noted.   NEUROLOGY EXAM:    Patient s speech was normal with no aphasia or dysarthria. Mentation, and affect were also normal.     Funduscopic exam was normal, with normal cup to disc ratio. Cranial nerves II -XII were intact.     Patient had normal mass, tone and motor strength was 5/5 in all extremities without pronator drift.     Sensation was decreased to light touch pinprick below knees    Reflexes were 1+ upper extremity absent at knees and ankles    No dysmetria noted on FNF.    Gait testing was normal.     DIAGNOSTIC STUDIES     PERTINENT RADIOLOGY  Following imaging studies were reviewed:     MRI BRAIN 12/16/2008  1.  No acute infarcts, mass lesions or hemorrhage  2.  Empty sella  3.  Mild to moderate cerebellar atrophy  4.  Perivascular space versus less likely small chronic lacunar infarct in the anterior right basal ganglia.  5.  Head MRA negative except for vascular variance as noted above    EEG 7/6/2010  This is a mildly abnormal EEG due to some rare very brief episodes of slowing in the temporal lobes independently.  1 of these episode was a little more sharp on the right consistent with a seizure focus in the right temporal head " region    EEG 1/2/2009  This is an abnormal EEG due to intermittent bitemporal slowing and epileptiform discharges in the right temporal head region.  These findings are consistent with a seizure focus in the right temporal lobe, possibly also in the left.  The patient was contacted after this test was read and antiepileptic medications have been started     PERTINENT LABS  Following labs were reviewed:  Lab Requisition on 09/27/2021   Component Date Value     Culture 09/27/2021 No beta hemolytic Streptococcus isolated          Total time spent for face to face visit, reviewing labs/imaging studies, counseling and coordination of care was: 45 Minutes spent on the date of the encounter doing chart review, review of outside records, review of test results, interpretation of tests, patient visit and documentation       This note was dictated using voice recognition software.  Any grammatical or context distortions are unintentional and inherent to the software.    Orders Placed This Encounter   Procedures     Lamotrigine Level     Hepatic function panel      New Prescriptions    No medications on file     Modified Medications    Modified Medication Previous Medication    LAMOTRIGINE (LAMICTAL) 100 MG TABLET lamoTRIgine (LAMICTAL) 100 MG tablet       Take 1 tablet (100 mg) by mouth 2 times daily    TAKE 1 TABLET TWICE A DAY                     Again, thank you for allowing me to participate in the care of your patient.        Sincerely,        Kevyn Otero MD

## 2021-12-23 NOTE — PROGRESS NOTES
NEUROLOGY FOLLOW UP VISIT  NOTE       [facility] [place]  [address] [place] [address]  Tel: [phone]  Fax: [phone]  [email or web address]     Anjelica Rajan,  1940, MRN [id number]  PCP: Chilo Betts  Date: 2021      ASSESSMENT & PLAN     Visit Diagnosis  1. Simple partial seizure     Simple partial seizure  81-year-old female with HTN, HLD, DM who is been followed in our clinic for simple partial seizure.  Previously she was on Keppra but ever since she was switched to lamotrigine her symptoms have remained well controlled on low-dose.  I have recommended:    1.  Continue lamotrigine 100 mg twice daily.  I refilled her prescription, gave her 90-day supply with 3 refills  2.  Check lamotrigine level and hepatic profile  3.  Follow-up will be in 1 year    Thank you again for this referral, please feel free to contact me if you have any questions.    Kevyn Otero MD  [facility]  (Former[facility], [facility])     HISTORY OF PRESENT ILLNESS     Patient is a 81-year-old female with history of HTN, HLD, DM and simple partial seizures previously followed by Dr. Goodrich who returns for yearly follow-up.  She is a new patient for me.    Patient was initially evaluated in our clinic in  when she reported episodes of left sided weakness and facial numbness.  Initially she was worked up for transient ischemic attack and had MRI of the head and MRA that was normal.  However, EEG showed right temporal sharp activity and she was diagnosed with focal seizures.  She was initially on Keppra and subsequently switched to lamotrigine in .  This was done as he was feeling fatigued on Keppra.  According to patient the seizures during this episode she never loses consciousness.  She gets numbness tingling on the left side.  She has been symptom-free for more than 6 years.     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    Surgical History:  She  has a past surgical history that includes IR Renal Angiogram Bilateral (11/26/2002).     SOCIAL HISTORY     Reviewed, and she  reports that she has never smoked. She has never used smokeless tobacco. She reports that she does not drink alcohol and does not use drugs.     FAMILY HISTORY     Reviewed, and family history includes Heart Disease in her father.     ALLERGIES     Allergies   Allergen Reactions     Glipizide      Other reaction(s): dizziness     Metformin Diarrhea         REVIEW OF SYSTEMS     A 12 point review of system was performed and was negative except as outlined in the history of present illness.     HOME MEDICATIONS     Current Outpatient Rx   Medication Sig Dispense Refill     allopurinol (ZYLOPRIM) 100 MG tablet Take 1 tablet by mouth 2 times daily       ASPIRIN 81 PO Take by mouth At Bedtime       atorvastatin (LIPITOR) 40 MG tablet Take 1 tablet by mouth daily       CALCIUM-VITAMIN D PO Take by mouth daily       clindamycin (CLEOCIN T) 1 % external lotion as needed       DIOVAN 160 MG tablet Take 1 tablet by mouth daily       GLUCOSAMINE-CHONDROITIN PO Take by mouth daily       hydrochlorothiazide (HYDRODIURIL) 25 MG tablet Take 12.5 mg by mouth daily       lamoTRIgine (LAMICTAL) 100 MG tablet Take 1 tablet (100 mg) by mouth 2 times daily 180 tablet 3     LANTUS SOLOSTAR 100 UNIT/ML soln        levothyroxine (SYNTHROID/LEVOTHROID) 75 MCG tablet Take 75 mcg by mouth daily       MULTIPLE VITAMIN PO Take by mouth daily       omeprazole (PRILOSEC) 20  "MG DR capsule Take 1 capsule by mouth daily       triamcinolone (KENALOG) 0.1 % external cream as needed           PHYSICAL EXAM     Vital signs  BP (!) 161/85 (BP Location: Left arm, Patient Position: Sitting)   Pulse 79   Ht 1.626 m (5' 4\")   Wt 91.6 kg (202 lb)   BMI 34.67 kg/m      Weight:   202 lbs 0 oz    Patient is alert and oriented x4 in no acute distress. Vital signs were reviewed and are documented in electronic medical record. Neck was supple, no carotid bruits, thyromegaly, JVD, or lymphadenopathy was noted.   NEUROLOGY EXAM:    Patient s speech was normal with no aphasia or dysarthria. Mentation, and affect were also normal.     Funduscopic exam was normal, with normal cup to disc ratio. Cranial nerves II -XII were intact.     Patient had normal mass, tone and motor strength was 5/5 in all extremities without pronator drift.     Sensation was decreased to light touch pinprick below knees    Reflexes were 1+ upper extremity absent at knees and ankles    No dysmetria noted on FNF.    Gait testing was normal.     DIAGNOSTIC STUDIES     PERTINENT RADIOLOGY  Following imaging studies were reviewed:     MRI BRAIN 12/16/2008  1.  No acute infarcts, mass lesions or hemorrhage  2.  Empty sella  3.  Mild to moderate cerebellar atrophy  4.  Perivascular space versus less likely small chronic lacunar infarct in the anterior right basal ganglia.  5.  Head MRA negative except for vascular variance as noted above    EEG 7/6/2010  This is a mildly abnormal EEG due to some rare very brief episodes of slowing in the temporal lobes independently.  1 of these episode was a little more sharp on the right consistent with a seizure focus in the right temporal head region    EEG 1/2/2009  This is an abnormal EEG due to intermittent bitemporal slowing and epileptiform discharges in the right temporal head region.  These findings are consistent with a seizure focus in the right temporal lobe, possibly also in the left.  The " patient was contacted after this test was read and antiepileptic medications have been started     PERTINENT LABS  Following labs were reviewed:  Lab Requisition on 09/27/2021   Component Date Value     Culture 09/27/2021 No beta hemolytic Streptococcus isolated          Total time spent for face to face visit, reviewing labs/imaging studies, counseling and coordination of care was: 45 Minutes spent on the date of the encounter doing chart review, review of outside records, review of test results, interpretation of tests, patient visit and documentation       This note was dictated using voice recognition software.  Any grammatical or context distortions are unintentional and inherent to the software.    Orders Placed This Encounter   Procedures     Lamotrigine Level     Hepatic function panel      New Prescriptions    No medications on file     Modified Medications    Modified Medication Previous Medication    LAMOTRIGINE (LAMICTAL) 100 MG TABLET lamoTRIgine (LAMICTAL) 100 MG tablet       Take 1 tablet (100 mg) by mouth 2 times daily    TAKE 1 TABLET TWICE A DAY

## 2022-03-18 ENCOUNTER — LAB REQUISITION (OUTPATIENT)
Dept: LAB | Facility: CLINIC | Age: 82
End: 2022-03-18
Payer: MEDICARE

## 2022-03-18 DIAGNOSIS — I10 ESSENTIAL (PRIMARY) HYPERTENSION: ICD-10-CM

## 2022-03-18 LAB
ANION GAP SERPL CALCULATED.3IONS-SCNC: 15 MMOL/L (ref 5–18)
BUN SERPL-MCNC: 16 MG/DL (ref 8–28)
CALCIUM SERPL-MCNC: 9.5 MG/DL (ref 8.5–10.5)
CHLORIDE BLD-SCNC: 100 MMOL/L (ref 98–107)
CO2 SERPL-SCNC: 25 MMOL/L (ref 22–31)
CREAT SERPL-MCNC: 1.07 MG/DL (ref 0.6–1.1)
GFR SERPL CREATININE-BSD FRML MDRD: 53 ML/MIN/1.73M2
GLUCOSE BLD-MCNC: 273 MG/DL (ref 70–125)
POTASSIUM BLD-SCNC: 4.6 MMOL/L (ref 3.5–5)
SODIUM SERPL-SCNC: 140 MMOL/L (ref 136–145)

## 2022-03-18 PROCEDURE — 80048 BASIC METABOLIC PNL TOTAL CA: CPT | Mod: ORL | Performed by: PHYSICIAN ASSISTANT

## 2022-06-20 ENCOUNTER — LAB REQUISITION (OUTPATIENT)
Dept: LAB | Facility: CLINIC | Age: 82
End: 2022-06-20
Payer: MEDICARE

## 2022-06-20 DIAGNOSIS — E78.2 MIXED HYPERLIPIDEMIA: ICD-10-CM

## 2022-06-20 DIAGNOSIS — E03.9 HYPOTHYROIDISM, UNSPECIFIED: ICD-10-CM

## 2022-06-20 LAB
CHOLEST SERPL-MCNC: 158 MG/DL
FASTING STATUS PATIENT QL REPORTED: ABNORMAL
HDLC SERPL-MCNC: 45 MG/DL
LDLC SERPL CALC-MCNC: 82 MG/DL
TRIGL SERPL-MCNC: 156 MG/DL
TSH SERPL DL<=0.005 MIU/L-ACNC: 3.34 UIU/ML (ref 0.3–5)

## 2022-06-20 PROCEDURE — 84443 ASSAY THYROID STIM HORMONE: CPT | Mod: ORL | Performed by: PHYSICIAN ASSISTANT

## 2022-06-20 PROCEDURE — 80061 LIPID PANEL: CPT | Mod: ORL | Performed by: PHYSICIAN ASSISTANT

## 2022-09-20 ENCOUNTER — LAB REQUISITION (OUTPATIENT)
Dept: LAB | Facility: CLINIC | Age: 82
End: 2022-09-20
Payer: MEDICARE

## 2022-09-20 DIAGNOSIS — E11.65 TYPE 2 DIABETES MELLITUS WITH HYPERGLYCEMIA (H): ICD-10-CM

## 2022-09-20 LAB
ANION GAP SERPL CALCULATED.3IONS-SCNC: 12 MMOL/L (ref 7–15)
BUN SERPL-MCNC: 11.9 MG/DL (ref 8–23)
CALCIUM SERPL-MCNC: 8.6 MG/DL (ref 8.8–10.2)
CHLORIDE SERPL-SCNC: 102 MMOL/L (ref 98–107)
CREAT SERPL-MCNC: 0.85 MG/DL (ref 0.51–0.95)
DEPRECATED HCO3 PLAS-SCNC: 28 MMOL/L (ref 22–29)
GFR SERPL CREATININE-BSD FRML MDRD: 69 ML/MIN/1.73M2
GLUCOSE SERPL-MCNC: 161 MG/DL (ref 70–99)
POTASSIUM SERPL-SCNC: 3.7 MMOL/L (ref 3.4–5.3)
SODIUM SERPL-SCNC: 142 MMOL/L (ref 136–145)

## 2022-09-20 PROCEDURE — 80048 BASIC METABOLIC PNL TOTAL CA: CPT | Mod: ORL | Performed by: PHYSICIAN ASSISTANT

## 2022-12-13 ENCOUNTER — LAB REQUISITION (OUTPATIENT)
Dept: LAB | Facility: CLINIC | Age: 82
End: 2022-12-13
Payer: MEDICARE

## 2022-12-13 DIAGNOSIS — E11.65 TYPE 2 DIABETES MELLITUS WITH HYPERGLYCEMIA (H): ICD-10-CM

## 2022-12-13 LAB
ANION GAP SERPL CALCULATED.3IONS-SCNC: 15 MMOL/L (ref 7–15)
BUN SERPL-MCNC: 21.4 MG/DL (ref 8–23)
CALCIUM SERPL-MCNC: 9.1 MG/DL (ref 8.8–10.2)
CHLORIDE SERPL-SCNC: 101 MMOL/L (ref 98–107)
CREAT SERPL-MCNC: 1.18 MG/DL (ref 0.51–0.95)
DEPRECATED HCO3 PLAS-SCNC: 24 MMOL/L (ref 22–29)
GFR SERPL CREATININE-BSD FRML MDRD: 46 ML/MIN/1.73M2
GLUCOSE SERPL-MCNC: 176 MG/DL (ref 70–99)
POTASSIUM SERPL-SCNC: 4 MMOL/L (ref 3.4–5.3)
SODIUM SERPL-SCNC: 140 MMOL/L (ref 136–145)

## 2022-12-13 PROCEDURE — 80048 BASIC METABOLIC PNL TOTAL CA: CPT | Mod: ORL | Performed by: PHYSICIAN ASSISTANT

## 2022-12-27 ENCOUNTER — OFFICE VISIT (OUTPATIENT)
Dept: NEUROLOGY | Facility: CLINIC | Age: 82
End: 2022-12-27
Payer: MEDICARE

## 2022-12-27 VITALS
DIASTOLIC BLOOD PRESSURE: 72 MMHG | HEART RATE: 83 BPM | HEIGHT: 64 IN | WEIGHT: 190 LBS | BODY MASS INDEX: 32.44 KG/M2 | SYSTOLIC BLOOD PRESSURE: 142 MMHG

## 2022-12-27 DIAGNOSIS — G40.109 SIMPLE PARTIAL SEIZURE DISORDER (H): Primary | ICD-10-CM

## 2022-12-27 PROCEDURE — 99214 OFFICE O/P EST MOD 30 MIN: CPT | Performed by: PSYCHIATRY & NEUROLOGY

## 2022-12-27 RX ORDER — LAMOTRIGINE 100 MG/1
100 TABLET ORAL 2 TIMES DAILY
Qty: 180 TABLET | Refills: 3 | Status: SHIPPED | OUTPATIENT
Start: 2022-12-27 | End: 2023-12-27

## 2022-12-27 RX ORDER — EMPAGLIFLOZIN 10 MG/1
10 TABLET, FILM COATED ORAL DAILY
COMMUNITY
Start: 2022-12-02 | End: 2023-05-10

## 2022-12-27 NOTE — NURSING NOTE
Chief Complaint   Patient presents with     Seizures     Annual follow up     Sita Vaz CMA on 12/27/2022 at 10:38 AM

## 2022-12-27 NOTE — LETTER
2022         RE: Anjelica Rajan  [address]  [facility] [address]        Dear Colleague,    Thank you for referring your patient, Anjelica Rajan, to the [facility] [place]. Please see a copy of my visit note below.    NEUROLOGY FOLLOW UP VISIT  NOTE       [facility] [place]  [address] [place] [address]  Tel: [phone]  Fax: [phone]  [email or web address]     Anjelica Rajan,  1940, MRN [id number]  PCP: Chilo Betts  Date: 2022      ASSESSMENT & PLAN     Visit Diagnosis  1. Simple partial seizure disorder (H)     Simple partial seizure  82-year-old female with history of HTN, HLD, DM with simple partial seizures who returns for yearly follow-up.  Her symptoms are well controlled.  Previously she was on Keppra but had side effects but ever since she was switched to lamotrigine as she is doing well on a low-dose.  I have recommended:    1.  Continue lamotrigine 100 mg twice daily.  Prescriptions were filled  2.  Check comprehensive metabolic panel and lamotrigine level.  3.  Follow-up will be in 1 year    Thank you again for this referral, please feel free to contact me if you have any questions.    Kevyn Otero MD  [facility]  (Formerly, [facility]     HISTORY OF PRESENT ILLNESS     Patient is a 82-year-old male with history of HTN, HLD, DM with simple partial seizure who returns for yearly follow-up.  He was last seen on 2021 when he was continued on lamotrigine.  Previously he was on Keppra but after he was switched to lamotrigine his symptoms have remained well controlled.  I had ordered a lamotrigine level during his last visit that was not done.  She denies any episodes of focal seizures since her last saw her.  She has no specific complaints and wants her medication refill.  She is quite apologetic for not getting the lab work done last  year.    Patient was initially evaluated in our clinic in 2008 when she reported episodes of left sided weakness and facial numbness.  Initially she was worked up for transient ischemic attack and had MRI of the head and MRA that was normal.  However, EEG showed right temporal sharp activity and she was diagnosed with focal seizures.  She was initially on Keppra and subsequently switched to lamotrigine in 2015.  This was done as he was feeling fatigued on Keppra.  According to patient the seizures during this episode she never loses consciousness.  She gets numbness tingling on the left side.  She has been symptom-free for more than 6 years.     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    Surgical History:  She  has a past surgical history that includes IR Renal Angiogram Bilateral (11/26/2002).     SOCIAL HISTORY     Reviewed, and she  reports that she has never smoked. She has never used smokeless tobacco. She reports that she does not drink alcohol and does not use drugs.     FAMILY HISTORY     Reviewed, and family history includes Heart Disease in her father.     ALLERGIES     Allergies   Allergen Reactions     Glipizide      Other reaction(s): dizziness     Metformin Diarrhea         REVIEW OF SYSTEMS     A 12 point review of system was performed and was negative except as outlined in the history of present illness.     HOME MEDICATIONS     Current Outpatient Rx   Medication Sig Dispense Refill     allopurinol (ZYLOPRIM) 100 MG tablet Take 1 tablet by mouth 2 times daily       ASPIRIN 81 PO  "Take by mouth At Bedtime       atorvastatin (LIPITOR) 40 MG tablet Take 1 tablet by mouth daily       CALCIUM-VITAMIN D PO Take by mouth daily       clindamycin (CLEOCIN T) 1 % external lotion as needed       DIOVAN 160 MG tablet Take 1 tablet by mouth daily       GLUCOSAMINE-CHONDROITIN PO Take by mouth daily       hydrochlorothiazide (HYDRODIURIL) 25 MG tablet Take 12.5 mg by mouth daily       JARDIANCE 10 MG TABS tablet Take 10 mg by mouth daily       lamoTRIgine (LAMICTAL) 100 MG tablet Take 1 tablet (100 mg) by mouth 2 times daily 180 tablet 3     LANTUS SOLOSTAR 100 UNIT/ML soln        levothyroxine (SYNTHROID/LEVOTHROID) 75 MCG tablet Take 75 mcg by mouth daily       MULTIPLE VITAMIN PO Take by mouth daily       omeprazole (PRILOSEC) 20 MG DR capsule Take 1 capsule by mouth daily       triamcinolone (KENALOG) 0.1 % external cream as needed           PHYSICAL EXAM     Vital signs  BP (!) 142/72 (BP Location: Left arm, Patient Position: Sitting)   Pulse 83   Ht 1.626 m (5' 4\")   Wt 86.2 kg (190 lb)   BMI 32.61 kg/m      Weight:   190 lbs 0 oz    Patient is alert and oriented x4 in no acute distress. Vital signs were reviewed and are documented in electronic medical record. Neck was supple, no carotid bruits, thyromegaly, JVD, or lymphadenopathy was noted.   NEUROLOGY EXAM:    Patient s speech was normal with no aphasia or dysarthria. Mentation, and affect were also normal.     Funduscopic exam was normal, with normal cup to disc ratio. Cranial nerves II -XII were intact.  Except she is hard of hearing    Patient had normal mass, tone and motor strength was 5/5 in all extremities without pronator drift.     Sensation was decreased to light touch pinprick below knees    Reflexes were 1+ upper extremity absent at knees and ankles    No dysmetria noted on FNF.    Gait testing was normal.     PERTINENT DIAGNOSTIC STUDIES     Following studies were reviewed:     MRI BRAIN 12/16/2008  1.  No acute infarcts, mass " lesions or hemorrhage  2.  Empty sella  3.  Mild to moderate cerebellar atrophy  4.  Perivascular space versus less likely small chronic lacunar infarct in the anterior right basal ganglia.  5.  Head MRA negative except for vascular variance as noted above     EEG 7/6/2010  This is a mildly abnormal EEG due to some rare very brief episodes of slowing in the temporal lobes independently.  1 of these episode was a little more sharp on the right consistent with a seizure focus in the right temporal head region     EEG 1/2/2009  This is an abnormal EEG due to intermittent bitemporal slowing and epileptiform discharges in the right temporal head region.  These findings are consistent with a seizure focus in the right temporal lobe, possibly also in the left.  The patient was contacted after this test was read and antiepileptic medications have been started     PERTINENT LABS  Following labs were reviewed:  Lab Requisition on 12/13/2022   Component Date Value     Sodium 12/13/2022 140      Potassium 12/13/2022 4.0      Chloride 12/13/2022 101      Carbon Dioxide (CO2) 12/13/2022 24      Anion Gap 12/13/2022 15      Urea Nitrogen 12/13/2022 21.4      Creatinine 12/13/2022 1.18 (H)      Calcium 12/13/2022 9.1      Glucose 12/13/2022 176 (H)      GFR Estimate 12/13/2022 46 (L)          Total time spent for face to face visit, reviewing labs/imaging studies, counseling and coordination of care was: 30 Minutes spent on the date of the encounter doing chart review, review of outside records, review of test results, interpretation of tests, patient visit and documentation       This note was dictated using voice recognition software.  Any grammatical or context distortions are unintentional and inherent to the software.    Orders Placed This Encounter   Procedures     Lamotrigine Level     Comprehensive metabolic panel      New Prescriptions    No medications on file     Modified Medications    Modified Medication Previous  Medication    LAMOTRIGINE (LAMICTAL) 100 MG TABLET lamoTRIgine (LAMICTAL) 100 MG tablet       Take 1 tablet (100 mg) by mouth 2 times daily    Take 1 tablet (100 mg) by mouth 2 times daily                     Again, thank you for allowing me to participate in the care of your patient.        Sincerely,        Kevyn Otero MD

## 2022-12-27 NOTE — PROGRESS NOTES
NEUROLOGY FOLLOW UP VISIT  NOTE       [facility] [place]  [address] [place] [address]  Tel: [phone]  Fax: [phone]  [email or web address]     Anjelica Rajan,  1940, MRN [id number]  PCP: Chilo Betts  Date: 2022      ASSESSMENT & PLAN     Visit Diagnosis  1. Simple partial seizure disorder (H)     Simple partial seizure  82-year-old female with history of HTN, HLD, DM with simple partial seizures who returns for yearly follow-up.  Her symptoms are well controlled.  Previously she was on Keppra but had side effects but ever since she was switched to lamotrigine as she is doing well on a low-dose.  I have recommended:    1.  Continue lamotrigine 100 mg twice daily.  Prescriptions were filled  2.  Check comprehensive metabolic panel and lamotrigine level.  3.  Follow-up will be in 1 year    Thank you again for this referral, please feel free to contact me if you have any questions.    Kevyn Otero MD  [facility]  ([facility], [facility]     HISTORY OF PRESENT ILLNESS     Patient is a 82-year-old male with history of HTN, HLD, DM with simple partial seizure who returns for yearly follow-up.  He was last seen on 2021 when he was continued on lamotrigine.  Previously he was on Keppra but after he was switched to lamotrigine his symptoms have remained well controlled.  I had ordered a lamotrigine level during his last visit that was not done.  She denies any episodes of focal seizures since her last saw her.  She has no specific complaints and wants her medication refill.  She is quite apologetic for not getting the lab work done last year.    Patient was initially evaluated in our clinic in  when she reported episodes of left sided weakness and facial numbness.  Initially she was worked up for transient ischemic attack and had MRI of the head and MRA that was normal.  However, EEG showed  right temporal sharp activity and she was diagnosed with focal seizures.  She was initially on Keppra and subsequently switched to lamotrigine in 2015.  This was done as he was feeling fatigued on Keppra.  According to patient the seizures during this episode she never loses consciousness.  She gets numbness tingling on the left side.  She has been symptom-free for more than 6 years.     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    Surgical History:  She  has a past surgical history that includes IR Renal Angiogram Bilateral (11/26/2002).     SOCIAL HISTORY     Reviewed, and she  reports that she has never smoked. She has never used smokeless tobacco. She reports that she does not drink alcohol and does not use drugs.     FAMILY HISTORY     Reviewed, and family history includes Heart Disease in her father.     ALLERGIES     Allergies   Allergen Reactions     Glipizide      Other reaction(s): dizziness     Metformin Diarrhea         REVIEW OF SYSTEMS     A 12 point review of system was performed and was negative except as outlined in the history of present illness.     HOME MEDICATIONS     Current Outpatient Rx   Medication Sig Dispense Refill     allopurinol (ZYLOPRIM) 100 MG tablet Take 1 tablet by mouth 2 times daily       ASPIRIN 81 PO Take by mouth At Bedtime       atorvastatin (LIPITOR) 40 MG tablet Take 1 tablet by mouth daily       CALCIUM-VITAMIN D PO Take by mouth daily       clindamycin (CLEOCIN T) 1 % external lotion as needed       DIOVAN 160 MG tablet Take 1 tablet by mouth daily        "GLUCOSAMINE-CHONDROITIN PO Take by mouth daily       hydrochlorothiazide (HYDRODIURIL) 25 MG tablet Take 12.5 mg by mouth daily       JARDIANCE 10 MG TABS tablet Take 10 mg by mouth daily       lamoTRIgine (LAMICTAL) 100 MG tablet Take 1 tablet (100 mg) by mouth 2 times daily 180 tablet 3     LANTUS SOLOSTAR 100 UNIT/ML soln        levothyroxine (SYNTHROID/LEVOTHROID) 75 MCG tablet Take 75 mcg by mouth daily       MULTIPLE VITAMIN PO Take by mouth daily       omeprazole (PRILOSEC) 20 MG DR capsule Take 1 capsule by mouth daily       triamcinolone (KENALOG) 0.1 % external cream as needed           PHYSICAL EXAM     Vital signs  BP (!) 142/72 (BP Location: Left arm, Patient Position: Sitting)   Pulse 83   Ht 1.626 m (5' 4\")   Wt 86.2 kg (190 lb)   BMI 32.61 kg/m      Weight:   190 lbs 0 oz    Patient is alert and oriented x4 in no acute distress. Vital signs were reviewed and are documented in electronic medical record. Neck was supple, no carotid bruits, thyromegaly, JVD, or lymphadenopathy was noted.   NEUROLOGY EXAM:    Patient s speech was normal with no aphasia or dysarthria. Mentation, and affect were also normal.     Funduscopic exam was normal, with normal cup to disc ratio. Cranial nerves II -XII were intact.  Except she is hard of hearing    Patient had normal mass, tone and motor strength was 5/5 in all extremities without pronator drift.     Sensation was decreased to light touch pinprick below knees    Reflexes were 1+ upper extremity absent at knees and ankles    No dysmetria noted on FNF.    Gait testing was normal.     PERTINENT DIAGNOSTIC STUDIES     Following studies were reviewed:     MRI BRAIN 12/16/2008  1.  No acute infarcts, mass lesions or hemorrhage  2.  Empty sella  3.  Mild to moderate cerebellar atrophy  4.  Perivascular space versus less likely small chronic lacunar infarct in the anterior right basal ganglia.  5.  Head MRA negative except for vascular variance as noted above     EEG " 7/6/2010  This is a mildly abnormal EEG due to some rare very brief episodes of slowing in the temporal lobes independently.  1 of these episode was a little more sharp on the right consistent with a seizure focus in the right temporal head region     EEG 1/2/2009  This is an abnormal EEG due to intermittent bitemporal slowing and epileptiform discharges in the right temporal head region.  These findings are consistent with a seizure focus in the right temporal lobe, possibly also in the left.  The patient was contacted after this test was read and antiepileptic medications have been started     PERTINENT LABS  Following labs were reviewed:  Lab Requisition on 12/13/2022   Component Date Value     Sodium 12/13/2022 140      Potassium 12/13/2022 4.0      Chloride 12/13/2022 101      Carbon Dioxide (CO2) 12/13/2022 24      Anion Gap 12/13/2022 15      Urea Nitrogen 12/13/2022 21.4      Creatinine 12/13/2022 1.18 (H)      Calcium 12/13/2022 9.1      Glucose 12/13/2022 176 (H)      GFR Estimate 12/13/2022 46 (L)          Total time spent for face to face visit, reviewing labs/imaging studies, counseling and coordination of care was: 30 Minutes spent on the date of the encounter doing chart review, review of outside records, review of test results, interpretation of tests, patient visit and documentation       This note was dictated using voice recognition software.  Any grammatical or context distortions are unintentional and inherent to the software.    Orders Placed This Encounter   Procedures     Lamotrigine Level     Comprehensive metabolic panel      New Prescriptions    No medications on file     Modified Medications    Modified Medication Previous Medication    LAMOTRIGINE (LAMICTAL) 100 MG TABLET lamoTRIgine (LAMICTAL) 100 MG tablet       Take 1 tablet (100 mg) by mouth 2 times daily    Take 1 tablet (100 mg) by mouth 2 times daily

## 2023-02-15 ENCOUNTER — LAB REQUISITION (OUTPATIENT)
Dept: LAB | Facility: CLINIC | Age: 83
End: 2023-02-15
Payer: MEDICARE

## 2023-02-15 DIAGNOSIS — G40.909 EPILEPSY, UNSPECIFIED, NOT INTRACTABLE, WITHOUT STATUS EPILEPTICUS (H): ICD-10-CM

## 2023-02-15 LAB
ALBUMIN SERPL BCG-MCNC: 3.9 G/DL (ref 3.5–5.2)
ALP SERPL-CCNC: 133 U/L (ref 35–104)
ALT SERPL W P-5'-P-CCNC: 19 U/L (ref 10–35)
ANION GAP SERPL CALCULATED.3IONS-SCNC: 16 MMOL/L (ref 7–15)
AST SERPL W P-5'-P-CCNC: 22 U/L (ref 10–35)
BILIRUB SERPL-MCNC: 0.3 MG/DL
BUN SERPL-MCNC: 19.9 MG/DL (ref 8–23)
CALCIUM SERPL-MCNC: 8.9 MG/DL (ref 8.8–10.2)
CHLORIDE SERPL-SCNC: 101 MMOL/L (ref 98–107)
CREAT SERPL-MCNC: 1.17 MG/DL (ref 0.51–0.95)
DEPRECATED HCO3 PLAS-SCNC: 23 MMOL/L (ref 22–29)
GFR SERPL CREATININE-BSD FRML MDRD: 47 ML/MIN/1.73M2
GLUCOSE SERPL-MCNC: 201 MG/DL (ref 70–99)
POTASSIUM SERPL-SCNC: 4.4 MMOL/L (ref 3.4–5.3)
PROT SERPL-MCNC: 6.9 G/DL (ref 6.4–8.3)
SODIUM SERPL-SCNC: 140 MMOL/L (ref 136–145)

## 2023-02-15 PROCEDURE — 80175 DRUG SCREEN QUAN LAMOTRIGINE: CPT | Mod: ORL | Performed by: PHYSICIAN ASSISTANT

## 2023-02-15 PROCEDURE — 80053 COMPREHEN METABOLIC PANEL: CPT | Mod: ORL | Performed by: PHYSICIAN ASSISTANT

## 2023-02-17 LAB — LAMOTRIGINE SERPL-MCNC: 6.5 UG/ML

## 2023-04-04 ENCOUNTER — ANCILLARY PROCEDURE (OUTPATIENT)
Dept: MAMMOGRAPHY | Facility: HOSPITAL | Age: 83
End: 2023-04-04
Attending: PHYSICIAN ASSISTANT
Payer: MEDICARE

## 2023-04-04 DIAGNOSIS — Z12.31 SCREENING MAMMOGRAM, ENCOUNTER FOR: ICD-10-CM

## 2023-04-04 PROCEDURE — 77067 SCR MAMMO BI INCL CAD: CPT

## 2023-05-10 ENCOUNTER — APPOINTMENT (OUTPATIENT)
Dept: RADIOLOGY | Facility: HOSPITAL | Age: 83
End: 2023-05-10
Attending: EMERGENCY MEDICINE
Payer: MEDICARE

## 2023-05-10 ENCOUNTER — HOSPITAL ENCOUNTER (OUTPATIENT)
Facility: HOSPITAL | Age: 83
Setting detail: OBSERVATION
Discharge: HOME | End: 2023-05-11
Attending: EMERGENCY MEDICINE | Admitting: EMERGENCY MEDICINE
Payer: MEDICARE

## 2023-05-10 DIAGNOSIS — I10 BENIGN ESSENTIAL HYPERTENSION: ICD-10-CM

## 2023-05-10 DIAGNOSIS — R07.89 ATYPICAL CHEST PAIN: ICD-10-CM

## 2023-05-10 LAB
ALBUMIN SERPL BCG-MCNC: 3.9 G/DL (ref 3.5–5.2)
ALP SERPL-CCNC: 127 U/L (ref 35–104)
ALT SERPL W P-5'-P-CCNC: 15 U/L (ref 10–35)
ANION GAP SERPL CALCULATED.3IONS-SCNC: 13 MMOL/L (ref 7–15)
AST SERPL W P-5'-P-CCNC: 23 U/L (ref 10–35)
BILIRUB SERPL-MCNC: 0.4 MG/DL
BUN SERPL-MCNC: 17.2 MG/DL (ref 8–23)
CALCIUM SERPL-MCNC: 9.3 MG/DL (ref 8.8–10.2)
CHLORIDE SERPL-SCNC: 106 MMOL/L (ref 98–107)
CREAT SERPL-MCNC: 0.84 MG/DL (ref 0.51–0.95)
DEPRECATED HCO3 PLAS-SCNC: 24 MMOL/L (ref 22–29)
ERYTHROCYTE [DISTWIDTH] IN BLOOD BY AUTOMATED COUNT: 17.5 % (ref 10–15)
GFR SERPL CREATININE-BSD FRML MDRD: 69 ML/MIN/1.73M2
GLUCOSE BLDC GLUCOMTR-MCNC: 118 MG/DL (ref 70–99)
GLUCOSE BLDC GLUCOMTR-MCNC: 131 MG/DL (ref 70–99)
GLUCOSE BLDC GLUCOMTR-MCNC: 153 MG/DL (ref 70–99)
GLUCOSE SERPL-MCNC: 141 MG/DL (ref 70–99)
HBA1C MFR BLD: 8.8 %
HCT VFR BLD AUTO: 42.8 % (ref 35–47)
HGB BLD-MCNC: 13.5 G/DL (ref 11.7–15.7)
HOLD SPECIMEN: NORMAL
HOLD SPECIMEN: NORMAL
MAGNESIUM SERPL-MCNC: 1.9 MG/DL (ref 1.7–2.3)
MCH RBC QN AUTO: 25.3 PG (ref 26.5–33)
MCHC RBC AUTO-ENTMCNC: 31.5 G/DL (ref 31.5–36.5)
MCV RBC AUTO: 80 FL (ref 78–100)
PLATELET # BLD AUTO: 294 10E3/UL (ref 150–450)
POTASSIUM SERPL-SCNC: 4 MMOL/L (ref 3.4–5.3)
PROT SERPL-MCNC: 7.7 G/DL (ref 6.4–8.3)
RBC # BLD AUTO: 5.33 10E6/UL (ref 3.8–5.2)
SODIUM SERPL-SCNC: 143 MMOL/L (ref 136–145)
TROPONIN T SERPL HS-MCNC: 11 NG/L
TROPONIN T SERPL HS-MCNC: 16 NG/L
TROPONIN T SERPL HS-MCNC: 18 NG/L
TSH SERPL DL<=0.005 MIU/L-ACNC: 2.3 UIU/ML (ref 0.3–4.2)
WBC # BLD AUTO: 10.5 10E3/UL (ref 4–11)

## 2023-05-10 PROCEDURE — 83036 HEMOGLOBIN GLYCOSYLATED A1C: CPT | Performed by: EMERGENCY MEDICINE

## 2023-05-10 PROCEDURE — 84484 ASSAY OF TROPONIN QUANT: CPT | Performed by: EMERGENCY MEDICINE

## 2023-05-10 PROCEDURE — 80053 COMPREHEN METABOLIC PANEL: CPT | Performed by: EMERGENCY MEDICINE

## 2023-05-10 PROCEDURE — 82962 GLUCOSE BLOOD TEST: CPT

## 2023-05-10 PROCEDURE — 96374 THER/PROPH/DIAG INJ IV PUSH: CPT

## 2023-05-10 PROCEDURE — G0378 HOSPITAL OBSERVATION PER HR: HCPCS

## 2023-05-10 PROCEDURE — 36415 COLL VENOUS BLD VENIPUNCTURE: CPT | Performed by: EMERGENCY MEDICINE

## 2023-05-10 PROCEDURE — 85014 HEMATOCRIT: CPT | Performed by: EMERGENCY MEDICINE

## 2023-05-10 PROCEDURE — 96376 TX/PRO/DX INJ SAME DRUG ADON: CPT

## 2023-05-10 PROCEDURE — 71045 X-RAY EXAM CHEST 1 VIEW: CPT

## 2023-05-10 PROCEDURE — 250N000013 HC RX MED GY IP 250 OP 250 PS 637: Performed by: EMERGENCY MEDICINE

## 2023-05-10 PROCEDURE — 83735 ASSAY OF MAGNESIUM: CPT | Performed by: EMERGENCY MEDICINE

## 2023-05-10 PROCEDURE — 96375 TX/PRO/DX INJ NEW DRUG ADDON: CPT

## 2023-05-10 PROCEDURE — 250N000009 HC RX 250: Performed by: EMERGENCY MEDICINE

## 2023-05-10 PROCEDURE — 250N000011 HC RX IP 250 OP 636: Performed by: EMERGENCY MEDICINE

## 2023-05-10 PROCEDURE — 84443 ASSAY THYROID STIM HORMONE: CPT | Performed by: EMERGENCY MEDICINE

## 2023-05-10 PROCEDURE — 250N000012 HC RX MED GY IP 250 OP 636 PS 637: Performed by: EMERGENCY MEDICINE

## 2023-05-10 PROCEDURE — 99223 1ST HOSP IP/OBS HIGH 75: CPT | Mod: AI | Performed by: EMERGENCY MEDICINE

## 2023-05-10 PROCEDURE — 99285 EMERGENCY DEPT VISIT HI MDM: CPT | Mod: 25

## 2023-05-10 PROCEDURE — 93005 ELECTROCARDIOGRAM TRACING: CPT | Performed by: EMERGENCY MEDICINE

## 2023-05-10 PROCEDURE — 96372 THER/PROPH/DIAG INJ SC/IM: CPT | Mod: XS | Performed by: EMERGENCY MEDICINE

## 2023-05-10 RX ORDER — ENOXAPARIN SODIUM 100 MG/ML
40 INJECTION SUBCUTANEOUS EVERY 24 HOURS
Status: DISCONTINUED | OUTPATIENT
Start: 2023-05-10 | End: 2023-05-11 | Stop reason: HOSPADM

## 2023-05-10 RX ORDER — HYDROCHLOROTHIAZIDE 12.5 MG/1
12.5 TABLET ORAL DAILY
Status: DISCONTINUED | OUTPATIENT
Start: 2023-05-11 | End: 2023-05-11 | Stop reason: HOSPADM

## 2023-05-10 RX ORDER — ATORVASTATIN CALCIUM 40 MG/1
40 TABLET, FILM COATED ORAL AT BEDTIME
Status: DISCONTINUED | OUTPATIENT
Start: 2023-05-10 | End: 2023-05-11 | Stop reason: HOSPADM

## 2023-05-10 RX ORDER — NICOTINE POLACRILEX 4 MG
15-30 LOZENGE BUCCAL
Status: DISCONTINUED | OUTPATIENT
Start: 2023-05-10 | End: 2023-05-11 | Stop reason: HOSPADM

## 2023-05-10 RX ORDER — LEVOTHYROXINE SODIUM 75 UG/1
75 CAPSULE ORAL DAILY
COMMUNITY

## 2023-05-10 RX ORDER — ONDANSETRON 2 MG/ML
4 INJECTION INTRAMUSCULAR; INTRAVENOUS EVERY 6 HOURS PRN
Status: DISCONTINUED | OUTPATIENT
Start: 2023-05-10 | End: 2023-05-11 | Stop reason: HOSPADM

## 2023-05-10 RX ORDER — VALSARTAN 160 MG/1
160 TABLET ORAL DAILY
COMMUNITY

## 2023-05-10 RX ORDER — ASPIRIN 81 MG/1
81 TABLET, CHEWABLE ORAL AT BEDTIME
Status: DISCONTINUED | OUTPATIENT
Start: 2023-05-11 | End: 2023-05-11 | Stop reason: HOSPADM

## 2023-05-10 RX ORDER — DEXTROSE MONOHYDRATE 25 G/50ML
25-50 INJECTION, SOLUTION INTRAVENOUS
Status: DISCONTINUED | OUTPATIENT
Start: 2023-05-10 | End: 2023-05-11 | Stop reason: HOSPADM

## 2023-05-10 RX ORDER — AMOXICILLIN 250 MG
1 CAPSULE ORAL 2 TIMES DAILY PRN
Status: DISCONTINUED | OUTPATIENT
Start: 2023-05-10 | End: 2023-05-11 | Stop reason: HOSPADM

## 2023-05-10 RX ORDER — KETOCONAZOLE 20 MG/ML
SHAMPOO TOPICAL DAILY PRN
COMMUNITY

## 2023-05-10 RX ORDER — VALSARTAN 80 MG/1
160 TABLET ORAL DAILY
Status: DISCONTINUED | OUTPATIENT
Start: 2023-05-11 | End: 2023-05-11 | Stop reason: HOSPADM

## 2023-05-10 RX ORDER — LEVOTHYROXINE SODIUM 75 UG/1
75 TABLET ORAL DAILY
Status: DISCONTINUED | OUTPATIENT
Start: 2023-05-10 | End: 2023-05-11 | Stop reason: HOSPADM

## 2023-05-10 RX ORDER — LAMOTRIGINE 100 MG/1
100 TABLET ORAL 2 TIMES DAILY
Status: DISCONTINUED | OUTPATIENT
Start: 2023-05-10 | End: 2023-05-11 | Stop reason: HOSPADM

## 2023-05-10 RX ORDER — SODIUM PHOSPHATE,MONO-DIBASIC 19G-7G/118
1 ENEMA (ML) RECTAL AT BEDTIME
COMMUNITY

## 2023-05-10 RX ORDER — HYDRALAZINE HYDROCHLORIDE 20 MG/ML
5 INJECTION INTRAMUSCULAR; INTRAVENOUS EVERY 6 HOURS PRN
Status: DISCONTINUED | OUTPATIENT
Start: 2023-05-10 | End: 2023-05-11 | Stop reason: HOSPADM

## 2023-05-10 RX ORDER — MULTIPLE VITAMINS W/ MINERALS TAB 9MG-400MCG
1 TAB ORAL AT BEDTIME
COMMUNITY

## 2023-05-10 RX ORDER — METOPROLOL TARTRATE 1 MG/ML
5 INJECTION, SOLUTION INTRAVENOUS
Status: COMPLETED | OUTPATIENT
Start: 2023-05-10 | End: 2023-05-10

## 2023-05-10 RX ORDER — ASPIRIN 81 MG/1
81 TABLET, CHEWABLE ORAL AT BEDTIME
COMMUNITY

## 2023-05-10 RX ORDER — ONDANSETRON 4 MG/1
4 TABLET, ORALLY DISINTEGRATING ORAL EVERY 6 HOURS PRN
Status: DISCONTINUED | OUTPATIENT
Start: 2023-05-10 | End: 2023-05-11 | Stop reason: HOSPADM

## 2023-05-10 RX ORDER — PANTOPRAZOLE SODIUM 40 MG/1
40 TABLET, DELAYED RELEASE ORAL DAILY
Status: DISCONTINUED | OUTPATIENT
Start: 2023-05-11 | End: 2023-05-11 | Stop reason: HOSPADM

## 2023-05-10 RX ORDER — ALLOPURINOL 100 MG/1
100 TABLET ORAL 2 TIMES DAILY
Status: DISCONTINUED | OUTPATIENT
Start: 2023-05-10 | End: 2023-05-11 | Stop reason: HOSPADM

## 2023-05-10 RX ORDER — LIDOCAINE 40 MG/G
CREAM TOPICAL
Status: DISCONTINUED | OUTPATIENT
Start: 2023-05-10 | End: 2023-05-11 | Stop reason: HOSPADM

## 2023-05-10 RX ORDER — AMOXICILLIN 250 MG
2 CAPSULE ORAL 2 TIMES DAILY PRN
Status: DISCONTINUED | OUTPATIENT
Start: 2023-05-10 | End: 2023-05-11 | Stop reason: HOSPADM

## 2023-05-10 RX ADMIN — ENOXAPARIN SODIUM 40 MG: 40 INJECTION SUBCUTANEOUS at 14:08

## 2023-05-10 RX ADMIN — ALLOPURINOL 100 MG: 100 TABLET ORAL at 21:24

## 2023-05-10 RX ADMIN — INSULIN ASPART 1 UNITS: 100 INJECTION, SOLUTION INTRAVENOUS; SUBCUTANEOUS at 17:44

## 2023-05-10 RX ADMIN — ATORVASTATIN CALCIUM 40 MG: 40 TABLET, FILM COATED ORAL at 21:24

## 2023-05-10 RX ADMIN — Medication 1 TABLET: at 21:23

## 2023-05-10 RX ADMIN — METOPROLOL TARTRATE 5 MG: 5 INJECTION INTRAVENOUS at 08:14

## 2023-05-10 RX ADMIN — HYDRALAZINE HYDROCHLORIDE 5 MG: 20 INJECTION INTRAMUSCULAR; INTRAVENOUS at 20:11

## 2023-05-10 RX ADMIN — LAMOTRIGINE 100 MG: 100 TABLET ORAL at 21:25

## 2023-05-10 RX ADMIN — INSULIN GLARGINE 30 UNITS: 100 INJECTION, SOLUTION SUBCUTANEOUS at 22:06

## 2023-05-10 RX ADMIN — METOPROLOL TARTRATE 5 MG: 5 INJECTION INTRAVENOUS at 07:47

## 2023-05-10 RX ADMIN — METOPROLOL TARTRATE 5 MG: 5 INJECTION INTRAVENOUS at 08:39

## 2023-05-10 ASSESSMENT — ACTIVITIES OF DAILY LIVING (ADL)
ADLS_ACUITY_SCORE: 35
DEPENDENT_IADLS:: INDEPENDENT
ADLS_ACUITY_SCORE: 31
ADLS_ACUITY_SCORE: 35

## 2023-05-10 NOTE — CONSULTS
"Care Management Initial Consult    General Information  Assessment completed with: Anjelica De La Fuente  Type of CM/SW Visit: Initial Assessment    Primary Care Provider verified and updated as needed: Yes   Readmission within the last 30 days: no previous admission in last 30 days      Reason for Consult: discharge planning  Advance Care Planning: Advance Care Planning Reviewed: no concerns identified          Communication Assessment  Patient's communication style: spoken language (English or Bilingual)             Cognitive  Cognitive/Neuro/Behavioral: WDL                      Living Environment:   People in home: alone     Current living Arrangements: house (\"I have 3 steps to enter the house. Then I live on the main level of the house and don't use any of the steps\".)      Able to return to prior arrangements: yes       Family/Social Support:  Care provided by: self  Provides care for: no one     Children          Description of Support System: Supportive, Involved    Support Assessment: Adequate family and caregiver support, Adequate social supports, Patient communicates needs well met    Current Resources:   Patient receiving home care services: No     Community Resources: None  Equipment currently used at home: glucometer  Supplies currently used at home: Diabetic Supplies    Employment/Financial:  Employment Status: retired, , previous service     Employment/ Comments: \"I have  benefits. I [facility]\". [facility] notification #: [id number].  Financial Concerns:     Referral to Financial Worker: No       Does the patient's insurance plan have a 3 day qualifying hospital stay waiver?  Yes   Will the waiver be used for post-acute placement? At this time no need for placement.    Lifestyle & Psychosocial Needs:  Social Determinants of Health     Tobacco Use: Low Risk  (5/10/2023)    Patient History      Smoking Tobacco Use: Never      Smokeless Tobacco " "Use: Never      Passive Exposure: Not on file   Alcohol Use: Not At Risk (12/15/2020)    AUDIT-C      Frequency of Alcohol Consumption: Never      Average Number of Drinks: Not on file      Frequency of Binge Drinking: Not on file   Financial Resource Strain: Not on file   Food Insecurity: Not on file   Transportation Needs: Not on file   Physical Activity: Not on file   Stress: Not on file   Social Connections: Not on file   Intimate Partner Violence: Not on file   Depression: Not at risk (12/27/2022)    PHQ-2      PHQ-2 Score: 0   Housing Stability: Not on file       Functional Status:  Prior to admission patient needed assistance:   Dependent ADLs:: Independent, Ambulation-no assistive device  Dependent IADLs:: Independent  Assesssment of Functional Status: At functional baseline    Mental Health Status:          Chemical Dependency Status:                Values/Beliefs:  Spiritual, Cultural Beliefs, Scientologist Practices, Values that affect care:                 Additional Information:  Anjelica lives in a house alone. \"I have 3 steps to enter the house. Then I live on the main level of the house and don't use any of the steps inside the house\".    She is independent with ADLs and IADLs at baseline and still drives. She states, \"my daughter can help me if I need the help\".    Likely no discharge needs at this time.    Family to transport at discharge.    [facility] notification #: [id number].    CM to follow for medical progression of care, discharge recommendations, and final discharge plan.    Roopa Monge RN      "

## 2023-05-10 NOTE — ED PROVIDER NOTES
"EMERGENCY DEPARTMENT ENCOUNTER      NAME: Anjelica Rajan  AGE: 83 year old female  YOB: 1940  MRN: [id number]  EVALUATION DATE & TIME: 5/10/2023  7:18 AM    PCP: Chilo Betts    ED PROVIDER: Lloyd Hein M.D.      Chief Complaint   Patient presents with     Chest Pain         FINAL IMPRESSION:  Hypertension.   Atypical chest pain        ED COURSE & MEDICAL DECISION MAKING:    Pertinent Labs & Imaging studies reviewed. (See chart for details)  83 year old female presents to the Emergency Department for evaluation of unusual left-sided chest/arm discomfort.  Patient reports around 430 she awakened with a slight tingling and achiness around her chin area.  Then seem to radiate into the shoulder and down the arm.  With this she had some \"numbness\".  Symptoms subsided prior to arrival.  Denies any associated shortness of breath, chest pain.  Reports no prior coronary artery disease.  Review of records indicate stress test done in 2008 which was negative per patient report.  Patient however with underlying hypertension, dyslipidemia, diabetes mellitus and partial complex seizures per review of records.  On exam patient is in elderly obese female in no distress.  Exam is unremarkable.  Vital signs significant for however for marked hypertension.  Given multiple cardiac risk factors concerns of atypical presentation of coronary disease patient given intravenous metoprolol for blood pressure moderation.  Baseline blood work being obtained including troponin.  Patient may require hospitalization given multiple risk factors and presentation.  EKG is unremarkable.. Patient appears non toxic with stable vitals signs. Overall exam is benign.      7:26 AM I met with the patient for the initial interview and physical examination. Discussed plan for treatment and workup in the ED.    8:18 AM.  Troponin normal 11.  Magnesium normal at 1.9.  Laboratory evaluation with minimal elevation of alkaline phosphatase and " glucose.  Noncontributory.  CBC unremarkable.  Will perform delta troponin given symptomatology and cardiac risk factors.  Patient's blood pressure has improved slightly after initial dose of intravenous metoprolol.  Second dose being given.   9:22 AM.  Blood pressure is normalized after multiple doses of metoprolol.  Delta troponin ordered.  10 AM.  Repeat troponin has increased slightly.  Does not hit criteria for acute coronary syndrome.  However given her symptomatology and multiple cardiac risk factors will admit for further blood pressure management and repeat troponins.  10:20 AM.  Patient informed of plan for hospitalization.      10:26 AM I spoke with Dr. Espinal, Hospitalist.       At the conclusion of the encounter I discussed the results of all of the tests and the disposition. The questions were answered and return precautions provided. The patient or family acknowledged understanding and was agreeable with the care plan.         MEDICATIONS GIVEN IN THE EMERGENCY:  Medications - No data to display    NEW PRESCRIPTIONS STARTED AT TODAY'S ER VISIT  New Prescriptions    No medications on file        Medical Decision Making    History:    Supplemental history from: Documented in chart, if applicable    External Record(s) reviewed: Documented in chart, if applicable.    Work Up:    Chart documentation includes differential considered and any EKGs or imaging independently interpreted by provider, where specified.    In additional to work up documented, I considered the following work up: Documented in chart, if applicable.    External consultation:    Discussion of management with another provider: Documented in chart, if applicable    Complicating factors:    Care impacted by chronic illness: Dyslipidemia, hypertension, diabetes mellitus, seizures    Care affected by social determinants of health: N/A    Disposition considerations:  Admit.    =================================================================    HPI    Patient information was obtained from: Patient    Use of Intrepreter: N/A       Anjelica Rajan is a 83 year old female with a pertient medical history of simple partial seizure disorder, CKD stage 3, essential hypertension, malignant melanoma, and DM Type II who presents to the ED for evaluation of chest pain.    Per Chart Review,  12/23/21 The patient presented to [facility] [place] for her yearly follow-up. Patient was initially evaluated in this clinic in 2008 when she reported episodes of left sided weakness and facial numbness. Initially she was worked up for transient ischemic attack and had an MRI of the head and MRA that was normal. However, EEG showed right temporal sharp activity and she was diagnosed with focal seizures. Patient was initially placed on Keppra and subsequently switched to lamotrigine in 2015. This was done as she was feeling fatigued on Keppra.     Patient reports that she developed a left sided chest pain this morning around 4:30 AM. Patient reports that after developing her pain she began to feel weak so she sat down. After sitting down patient reports that she developed numbness in her left arm. She says that her symptoms were mostly resolved by 6:30 AM so she decided to drive herself to the ED rather than calling for EMS. Patient reports that he blood sugar has been relatively normal recently. Patient endorses diarrhea but says that it does not seem to be abnormal for her. Patient denies shortness of breath, nausea, vomiting, diarrhea, any recent seizures, recent medication changes, or any other concerns at this time.    REVIEW OF SYSTEMS   Constitutional:  Denies fever, chills  Respiratory:  Denies productive cough or increased work of breathing  Cardiovascular: Endorses chest pain. Denies palpitations  GI: Endorses diarrhea. Denies abdominal pain, nausea, vomiting, or  change in bowel or bladder habits   Musculoskeletal:  Denies any new muscle/joint swelling  Skin:  Denies rash   Neurologic: Endorses left arm numbness. Denies focal weakness  All systems negative except as marked.     PAST MEDICAL HISTORY:  Past Medical History:   Diagnosis Date     Seizures (H)        PAST SURGICAL HISTORY:  Past Surgical History:   Procedure Laterality Date     IR RENAL ANGIOGRAM BILATERAL  11/26/2002       CURRENT MEDICATIONS:    No current facility-administered medications for this encounter.    Current Outpatient Medications:      allopurinol (ZYLOPRIM) 100 MG tablet, Take 1 tablet by mouth 2 times daily, Disp: , Rfl:      ASPIRIN 81 PO, Take by mouth At Bedtime, Disp: , Rfl:      atorvastatin (LIPITOR) 40 MG tablet, Take 1 tablet by mouth daily, Disp: , Rfl:      CALCIUM-VITAMIN D PO, Take by mouth daily, Disp: , Rfl:      clindamycin (CLEOCIN T) 1 % external lotion, as needed, Disp: , Rfl:      DIOVAN 160 MG tablet, Take 1 tablet by mouth daily, Disp: , Rfl:      GLUCOSAMINE-CHONDROITIN PO, Take by mouth daily, Disp: , Rfl:      hydrochlorothiazide (HYDRODIURIL) 25 MG tablet, Take 12.5 mg by mouth daily, Disp: , Rfl:      JARDIANCE 10 MG TABS tablet, Take 10 mg by mouth daily, Disp: , Rfl:      lamoTRIgine (LAMICTAL) 100 MG tablet, Take 1 tablet (100 mg) by mouth 2 times daily, Disp: 180 tablet, Rfl: 3     LANTUS SOLOSTAR 100 UNIT/ML soln, , Disp: , Rfl:      levothyroxine (SYNTHROID/LEVOTHROID) 75 MCG tablet, Take 75 mcg by mouth daily, Disp: , Rfl:      MULTIPLE VITAMIN PO, Take by mouth daily, Disp: , Rfl:      omeprazole (PRILOSEC) 20 MG DR capsule, Take 1 capsule by mouth daily, Disp: , Rfl:      triamcinolone (KENALOG) 0.1 % external cream, as needed, Disp: , Rfl:     ALLERGIES:  Allergies   Allergen Reactions     Glipizide      Other reaction(s): dizziness     Metformin Diarrhea       FAMILY HISTORY:  Family History   Problem Relation Age of Onset     Heart Disease Father         SOCIAL HISTORY:   Social History     Socioeconomic History     Marital status: Single     Spouse name: None     Number of children: None     Years of education: None     Highest education level: None   Tobacco Use     Smoking status: Never     Smokeless tobacco: Never   Substance and Sexual Activity     Alcohol use: Never     Drug use: Never       VITALS:  Patient Vitals for the past 24 hrs:   BP Temp Temp src Pulse Resp SpO2 Weight   05/10/23 0717 (!) 235/118 -- -- -- -- -- --   05/10/23 0711 -- 97.5  F (36.4  C) Oral 94 17 100 % 86.2 kg (190 lb)        PHYSICAL EXAM    Constitutional:  Awake, alert, in minimal distress, moderately obese  HENT:  Normocephalic, Atraumatic. Bilateral external ears normal. Oropharynx moist. Nose normal. Neck- Normal range of motion with no guarding, No midline cervical tenderness, Supple, No stridor.   Eyes:  PERRL, EOMI with no signs of entrapment, Conjunctiva normal, No discharge.   Respiratory:  Normal breath sounds, No respiratory distress, No wheezing.    Cardiovascular:  Normal heart rate, Normal rhythm, No appreciable rubs or gallops.   GI:  Soft, No tenderness, No distension, No palpable masses  Musculoskeletal:  Intact distal pulses, No edema. Good range of motion in all major joints. No tenderness to palpation or major deformities noted.  Integument:  Warm, Dry, No erythema, No rash.   Neurologic:  Alert & oriented, Normal motor function, Normal sensory function, No focal deficits noted.   Psychiatric:  Affect normal, Judgment normal, Mood normal.     LAB:  All pertinent labs reviewed and interpreted.     Results for orders placed or performed during the hospital encounter of 05/10/23   XR Chest 1 View     Status: None    Narrative    EXAM: XR CHEST 1 VIEW  LOCATION: [facility]  DATE/TIME: 5/10/2023 8:12 AM CDT    INDICATION: Neck left arm pain  COMPARISON: 06/08/2018      Impression    IMPRESSION: Negative chest.   Comprehensive metabolic  panel     Status: Abnormal   Result Value Ref Range    Sodium 143 136 - 145 mmol/L    Potassium 4.0 3.4 - 5.3 mmol/L    Chloride 106 98 - 107 mmol/L    Carbon Dioxide (CO2) 24 22 - 29 mmol/L    Anion Gap 13 7 - 15 mmol/L    Urea Nitrogen 17.2 8.0 - 23.0 mg/dL    Creatinine 0.84 0.51 - 0.95 mg/dL    Calcium 9.3 8.8 - 10.2 mg/dL    Glucose 141 (H) 70 - 99 mg/dL    Alkaline Phosphatase 127 (H) 35 - 104 U/L    AST 23 10 - 35 U/L    ALT 15 10 - 35 U/L    Protein Total 7.7 6.4 - 8.3 g/dL    Albumin 3.9 3.5 - 5.2 g/dL    Bilirubin Total 0.4 <=1.2 mg/dL    GFR Estimate 69 >60 mL/min/1.73m2   Troponin T, High Sensitivity     Status: Normal   Result Value Ref Range    Troponin T, High Sensitivity 11 <=14 ng/L   Magnesium     Status: Normal   Result Value Ref Range    Magnesium 1.9 1.7 - 2.3 mg/dL   CBC (+ platelets, no diff)     Status: Abnormal   Result Value Ref Range    WBC Count 10.5 4.0 - 11.0 10e3/uL    RBC Count 5.33 (H) 3.80 - 5.20 10e6/uL    Hemoglobin 13.5 11.7 - 15.7 g/dL    Hematocrit 42.8 35.0 - 47.0 %    MCV 80 78 - 100 fL    MCH 25.3 (L) 26.5 - 33.0 pg    MCHC 31.5 31.5 - 36.5 g/dL    RDW 17.5 (H) 10.0 - 15.0 %    Platelet Count 294 150 - 450 10e3/uL   [place] Draw     Status: None    Narrative    The following orders were created for panel order [place] Draw.  Procedure                               Abnormality         Status                     ---------                               -----------         ------                     Extra Blue Top Tube[[id number]]                              Final result               Extra Red Top Tube[[id number]]                               Final result                 Please view results for these tests on the individual orders.   Extra Blue Top Tube     Status: None   Result Value Ref Range    Hold Specimen JIC    Extra Red Top Tube     Status: None   Result Value Ref Range    Hold Specimen JIC    Troponin T, High Sensitivity (now)     Status: Abnormal   Result Value Ref  Range    Troponin T, High Sensitivity 16 (H) <=14 ng/L     RADIOLOGY:  Reviewed all pertinent imaging. Please see official radiology report.  No orders to display       EKG:    Normal sinus rhythm with occasional PVC.  Rate of 92.  Normal QRS.  Normal ST segments.  Normal EKG essentially unchanged from August 12, 2013  I have independently reviewed and interpreted the EKG(s) documented above.          I, Jose J Navarro, am serving as a scribe to document services personally performed by Lloyd Hein MD, based on my observation and the provider's statements to me. I, Lloyd Hein MD attest that Jose J Navarro is acting in a scribe capacity, has observed my performance of the services and has documented them in accordance with my direction.    Lloyd Hein M.D.  Emergency Medicine  [facility] EMERGENCY DEPARTMENT     Lloyd Hein MD  05/10/23 [id number]

## 2023-05-10 NOTE — H&P
ADMISSION HISTORY & PHYSICAL      Clinic, [address]/[place], [phone]  ASSESSMENT AND PLAN:  83 year old female presenting with chest pain:    1.  Chest pain: No known coronary artery disease but does have risk factors.  This awakened her at 430 this morning but is now gone.  Initial troponin negative, second troponin mildly elevated, will recheck again.  EKG shows no acute ischemia.  Will order nuclear stress test.    2.  Hypertensive urgency: Initial blood pressure 235/118.  Was given IV beta-blocker in the ED with improvement.  We will avoid further beta-blocker for stress test.  Continue home HCTZ and valsartan with as needed IV hydralazine.  May need blood pressure medication adjustment prior to discharge.    3.  Chronic kidney disease stage III: Creatinine normal    4.  Type 2 diabetes: Is on Lantus 30 units twice a day.  We will resume, start diabetic order set and check A1c.    5.  Hypothyroidism, will check TSH, continue home medication.    6.  Seizure disorder: Continue home meds    Barriers to discharge: Elevated blood pressure, cardiac [id number] discharge tomorrow if stress test negative and blood pressure well controlled.      CHIEF COMPLAINT:  Chest pain    HISTORY OF PRESENTING ILLNESS:  Anjelica Rajan is a 83 year old female who presents with chest pain which has now resolved.  She has no symptoms currently at rest.  She states around 4:30 AM she woke with a slight tingling and achiness in her chin area and then it radiated down her arm along with some substernal chest pressure.  She is not sure if there were any modifying factors but it has since resolved.  She denies history of similar symptoms.  No diaphoresis, no fevers or chills, no abdominal pain, no dyspnea.    PMH/PSH:  Patient Active Problem List   Diagnosis     Simple partial seizure disorder (H)     Chronic kidney disease, stage 3a (H)     Benign essential hypertension     H/O Malignant melanoma     Mixed hyperlipidemia      Type 2 diabetes mellitus without complication (H)     Acquired hypothyroidism     Atypical chest pain       ALLERGIES:  Allergies   Allergen Reactions     Glipizide      Other reaction(s): dizziness     Metformin Diarrhea       MEDICATIONS:  Reviewed.  No current facility-administered medications for this encounter.     Current Outpatient Medications   Medication     allopurinol (ZYLOPRIM) 100 MG tablet     aspirin (ASA) 81 MG chewable tablet     atorvastatin (LIPITOR) 40 MG tablet     calcium carbonate-vitamin D (OSCAL) 500-5 MG-MCG tablet     clindamycin (CLEOCIN T) 1 % external lotion     glucosamine-chondroitin 500-400 MG CAPS per capsule     hydrochlorothiazide (HYDRODIURIL) 25 MG tablet     ketoconazole (NIZORAL) 2 % external shampoo     lamoTRIgine (LAMICTAL) 100 MG tablet     LANTUS SOLOSTAR 100 UNIT/ML soln     levothyroxine (TIROSINT) 75 MCG capsule     metroNIDAZOLE (METROCREAM) 0.75 % external cream     multivitamin w/minerals (THERA-VIT-M) tablet     omeprazole (PRILOSEC) 20 MG DR capsule     valsartan (DIOVAN) 160 MG tablet       SOCIAL HISTORY:  Social History     Socioeconomic History     Marital status: Single     Spouse name: Not on file     Number of children: Not on file     Years of education: Not on file     Highest education level: Not on file   Occupational History     Not on file   Tobacco Use     Smoking status: Never     Smokeless tobacco: Never   Vaping Use     Vaping status: Not on file   Substance and Sexual Activity     Alcohol use: Never     Drug use: Never     Sexual activity: Not on file   Other Topics Concern     Parent/sibling w/ CABG, MI or angioplasty before 65F 55M? Not Asked   Social History Narrative     Not on file     Social Determinants of Health     Financial Resource Strain: Not on file   Food Insecurity: Not on file   Transportation Needs: Not on file   Physical Activity: Not on file   Stress: Not on file   Social Connections: Not on file   Intimate Partner  Violence: Not on file   Housing Stability: Not on file       FAMILY HISTORY:  Family History   Problem Relation Age of Onset     Heart Disease Father        ROS:  12 point ROS was performed and found to be negative except for the pertinent positives mentioned in the HPI.      PHYSICAL EXAM:  BP (!) 149/70   Pulse 69   Temp 97.5  F (36.4  C) (Oral)   Resp 26   Wt 86.2 kg (190 lb)   SpO2 94%   BMI 32.61 kg/m    No intake/output data recorded.  No intake/output data recorded.  CONSTITUTIONAL: No apparent distress  HEENT:       Sclera- anicteric      Mucous membrane- moist and pink  LUNGS: Clear to auscultation bilaterally  CARDIOVASCULAR: S1S2 regular. No murmurs, rubs or gallops, trace bilateral lower extremity edema  GI: Soft. Non-tender, non-distended. No organomegaly. No guarding or rigidity. Bowel sounds- active  NEURO: Cranial nerves II-XII grossly intact. No focal neurological deficit. No involuntary movements  INTGM: No cyanosis or clubbing  LYMPH:  MSK: no lower extremity joint swelling or tenderness  PSYCH: alert and oriented, no agitation      DIAGNOSTIC DATA:  Recent Results (from the past 24 hour(s))   Comprehensive metabolic panel    Collection Time: 05/10/23  7:36 AM   Result Value Ref Range    Sodium 143 136 - 145 mmol/L    Potassium 4.0 3.4 - 5.3 mmol/L    Chloride 106 98 - 107 mmol/L    Carbon Dioxide (CO2) 24 22 - 29 mmol/L    Anion Gap 13 7 - 15 mmol/L    Urea Nitrogen 17.2 8.0 - 23.0 mg/dL    Creatinine 0.84 0.51 - 0.95 mg/dL    Calcium 9.3 8.8 - 10.2 mg/dL    Glucose 141 (H) 70 - 99 mg/dL    Alkaline Phosphatase 127 (H) 35 - 104 U/L    AST 23 10 - 35 U/L    ALT 15 10 - 35 U/L    Protein Total 7.7 6.4 - 8.3 g/dL    Albumin 3.9 3.5 - 5.2 g/dL    Bilirubin Total 0.4 <=1.2 mg/dL    GFR Estimate 69 >60 mL/min/1.73m2   Troponin T, High Sensitivity    Collection Time: 05/10/23  7:36 AM   Result Value Ref Range    Troponin T, High Sensitivity 11 <=14 ng/L   Magnesium    Collection Time: 05/10/23   7:36 AM   Result Value Ref Range    Magnesium 1.9 1.7 - 2.3 mg/dL   CBC (+ platelets, no diff)    Collection Time: 05/10/23  7:36 AM   Result Value Ref Range    WBC Count 10.5 4.0 - 11.0 10e3/uL    RBC Count 5.33 (H) 3.80 - 5.20 10e6/uL    Hemoglobin 13.5 11.7 - 15.7 g/dL    Hematocrit 42.8 35.0 - 47.0 %    MCV 80 78 - 100 fL    MCH 25.3 (L) 26.5 - 33.0 pg    MCHC 31.5 31.5 - 36.5 g/dL    RDW 17.5 (H) 10.0 - 15.0 %    Platelet Count 294 150 - 450 10e3/uL   Extra Blue Top Tube    Collection Time: 05/10/23  7:36 AM   Result Value Ref Range    Hold Specimen JIC    Extra Red Top Tube    Collection Time: 05/10/23  7:36 AM   Result Value Ref Range    Hold Specimen JIC    Troponin T, High Sensitivity (now)    Collection Time: 05/10/23  9:28 AM   Result Value Ref Range    Troponin T, High Sensitivity 16 (H) <=14 ng/L     All lab studies reviewed personally    XR Chest 1 View    Result Date: 5/10/2023  EXAM: XR CHEST 1 VIEW LOCATION: [facility] DATE/TIME: 5/10/2023 8:12 AM CDT INDICATION: Neck left arm pain COMPARISON: 06/08/2018     IMPRESSION: Negative chest.    Radiology report reviewed.    Medical Decision Making       65 MINUTES SPENT BY ME on the date of service doing chart review, history, exam, documentation & further activities per the note.      Jose Carlos Espinal MD  [facility] Medicine Service

## 2023-05-10 NOTE — ED TRIAGE NOTES
Pt presents to triage ambulatory for chest pain. Pt reports L sided CP that started around 0430 this morning while pt was sleeping. Pt reports numbness spread to L side of arm. Pain and numbness is going away. No cardiac hx per pt.

## 2023-05-10 NOTE — PHARMACY-ADMISSION MEDICATION HISTORY
Pharmacist Admission Medication History    Admission medication history is complete. The information provided in this note is only as accurate as the sources available at the time of the update.    Medication reconciliation/reorder completed by provider prior to medication history? No    Information Source(s): Patient and CareEverywhere/SureScripts via in-person    Pertinent Information: None    Changes made to PTA medication list:    Added: ketoconazole, metronidazole,     Deleted: Jardiance, triamcinolone cream    Changed: Lantus dose, Levothyroxine formulation    Allergies reviewed with patient and updates made in EHR: yes    Medication History Completed By: Lindsey Dalton [facility] 5/10/2023 8:48 AM    Prior to Admission medications    Medication Sig Last Dose Taking? Auth Provider Long Term End Date   allopurinol (ZYLOPRIM) 100 MG tablet Take 1 tablet by mouth 2 times daily 5/10/2023 at x1 Yes Reported, Patient     aspirin (ASA) 81 MG chewable tablet Take 81 mg by mouth At Bedtime 5/10/2023 at 324 mg Yes Unknown, Entered By History     atorvastatin (LIPITOR) 40 MG tablet Take 1 tablet by mouth At Bedtime 5/9/2023 Yes Reported, Patient Yes    calcium carbonate-vitamin D (OSCAL) 500-5 MG-MCG tablet Take 1 tablet by mouth At Bedtime 5/9/2023 Yes Unknown, Entered By History     clindamycin (CLEOCIN T) 1 % external lotion Apply topically daily as needed (to face) Unknown at PRN Yes Reported, Patient     glucosamine-chondroitin 500-400 MG CAPS per capsule Take 1 capsule by mouth At Bedtime 5/9/2023 Yes Unknown, Entered By History     hydrochlorothiazide (HYDRODIURIL) 25 MG tablet Take 12.5 mg by mouth daily 5/10/2023 Yes Reported, Patient Yes    ketoconazole (NIZORAL) 2 % external shampoo Apply topically daily as needed for itching or irritation Unknown at PRN Yes Unknown, Entered By History     lamoTRIgine (LAMICTAL) 100 MG tablet Take 1 tablet (100 mg) by mouth 2 times daily 5/10/2023 at x1 Yes Kevyn Otero MD Yes     LANTUS SOLOSTAR 100 UNIT/ML soln Inject 30 Units Subcutaneous 2 times daily 5/9/2023 Yes Reported, Patient Yes    levothyroxine (TIROSINT) 75 MCG capsule Take 75 mcg by mouth daily 5/9/2023 at AM Yes Unknown, Entered By History No    metroNIDAZOLE (METROCREAM) 0.75 % external cream Apply topically 2 times daily as needed ([place]ce[place]) Unknown at PRN Yes Unknown, Entered By History     multivitamin w/minerals (THERA-VIT-M) tablet Take 1 tablet by mouth At Bedtime 5/9/2023 Yes Unknown, Entered By History     omeprazole (PRILOSEC) 20 MG DR capsule Take 1 capsule by mouth daily 5/10/2023 Yes Reported, Patient     valsartan (DIOVAN) 160 MG tablet Take 160 mg by mouth daily 5/10/2023 Yes Unknown, Entered By History No

## 2023-05-10 NOTE — PROGRESS NOTES
PRIMARY DIAGNOSIS: CHEST PAIN  OUTPATIENT/OBSERVATION GOALS TO BE MET BEFORE DISCHARGE:  1. Ruled out acute coronary syndrome (negative or stable Troponin):  Yes  2. Pain Status: Pain free.  3. Appropriate provocative testing performed: N/A  - Stress Test Procedure: Nuclear: Scheduled for tomorrow.  - Interpretation of cardiac rhythm per telemetry tech: NSR    4. Cleared by Consultants (if applicable):No  5. Return to near baseline physical activity: Yes  Discharge Planner Nurse   Safe discharge environment identified: Yes  Barriers to discharge: Yes       Entered by: Cecilia Moss RN 05/10/2023 6:59 PM     Per report pt is to have NM With Lexiscan tomorrow. Should be NPO after 0400. Needs to be NPO 4 hours prior. Unsure when scan will be completed. Pt ambulates independently when unhooked from lines.      Please review provider order for any additional goals.   Nurse to notify provider when observation goals have been met and patient is ready for discharge.

## 2023-05-11 ENCOUNTER — APPOINTMENT (OUTPATIENT)
Dept: CARDIOLOGY | Facility: HOSPITAL | Age: 83
End: 2023-05-11
Attending: EMERGENCY MEDICINE
Payer: MEDICARE

## 2023-05-11 ENCOUNTER — APPOINTMENT (OUTPATIENT)
Dept: NUCLEAR MEDICINE | Facility: HOSPITAL | Age: 83
End: 2023-05-11
Attending: EMERGENCY MEDICINE
Payer: MEDICARE

## 2023-05-11 VITALS
SYSTOLIC BLOOD PRESSURE: 126 MMHG | WEIGHT: 190.1 LBS | TEMPERATURE: 98 F | HEART RATE: 71 BPM | DIASTOLIC BLOOD PRESSURE: 60 MMHG | OXYGEN SATURATION: 94 % | HEIGHT: 64 IN | BODY MASS INDEX: 32.46 KG/M2 | RESPIRATION RATE: 16 BRPM

## 2023-05-11 PROBLEM — R07.89 ATYPICAL CHEST PAIN: Status: RESOLVED | Noted: 2023-05-10 | Resolved: 2023-05-11

## 2023-05-11 LAB
CV STRESS CURRENT BP HE: NORMAL
CV STRESS CURRENT HR HE: 74
CV STRESS CURRENT HR HE: 75
CV STRESS CURRENT HR HE: 77
CV STRESS CURRENT HR HE: 87
CV STRESS CURRENT HR HE: 87
CV STRESS CURRENT HR HE: 89
CV STRESS CURRENT HR HE: 89
CV STRESS CURRENT HR HE: 90
CV STRESS CURRENT HR HE: 91
CV STRESS CURRENT HR HE: 93
CV STRESS CURRENT HR HE: 96
CV STRESS CURRENT HR HE: 97
CV STRESS DEVIATION TIME HE: NORMAL
CV STRESS ECHO PERCENT HR HE: NORMAL
CV STRESS EXERCISE STAGE HE: NORMAL
CV STRESS FINAL RESTING BP HE: NORMAL
CV STRESS FINAL RESTING HR HE: 87
CV STRESS MAX HR HE: 97
CV STRESS MAX TREADMILL GRADE HE: 0
CV STRESS MAX TREADMILL SPEED HE: 0
CV STRESS PEAK DIA BP HE: NORMAL
CV STRESS PEAK SYS BP HE: NORMAL
CV STRESS PHASE HE: NORMAL
CV STRESS PROTOCOL HE: NORMAL
CV STRESS RESTING PT POSITION HE: NORMAL
CV STRESS ST DEVIATION AMOUNT HE: NORMAL
CV STRESS ST DEVIATION ELEVATION HE: NORMAL
CV STRESS ST EVELATION AMOUNT HE: NORMAL
CV STRESS TEST TYPE HE: NORMAL
CV STRESS TOTAL STAGE TIME MIN 1 HE: NORMAL
GLUCOSE BLDC GLUCOMTR-MCNC: 144 MG/DL (ref 70–99)
GLUCOSE BLDC GLUCOMTR-MCNC: 147 MG/DL (ref 70–99)
RATE PRESSURE PRODUCT: NORMAL
STRESS ECHO BASELINE DIASTOLIC HE: 77
STRESS ECHO BASELINE HR: 76
STRESS ECHO BASELINE SYSTOLIC BP: 184
STRESS ECHO CALCULATED PERCENT HR: 70 %
STRESS ECHO LAST STRESS DIASTOLIC BP: 76
STRESS ECHO LAST STRESS HR: 90
STRESS ECHO LAST STRESS SYSTOLIC BP: 165
STRESS ECHO TARGET HR: 139

## 2023-05-11 PROCEDURE — G1010 CDSM STANSON: HCPCS

## 2023-05-11 PROCEDURE — 82962 GLUCOSE BLOOD TEST: CPT

## 2023-05-11 PROCEDURE — G0378 HOSPITAL OBSERVATION PER HR: HCPCS

## 2023-05-11 PROCEDURE — 93016 CV STRESS TEST SUPVJ ONLY: CPT | Performed by: INTERNAL MEDICINE

## 2023-05-11 PROCEDURE — 250N000011 HC RX IP 250 OP 636: Performed by: EMERGENCY MEDICINE

## 2023-05-11 PROCEDURE — 250N000013 HC RX MED GY IP 250 OP 250 PS 637: Performed by: INTERNAL MEDICINE

## 2023-05-11 PROCEDURE — 78452 HT MUSCLE IMAGE SPECT MULT: CPT | Mod: ME

## 2023-05-11 PROCEDURE — 250N000013 HC RX MED GY IP 250 OP 250 PS 637: Performed by: EMERGENCY MEDICINE

## 2023-05-11 PROCEDURE — A9500 TC99M SESTAMIBI: HCPCS | Performed by: INTERNAL MEDICINE

## 2023-05-11 PROCEDURE — G1010 CDSM STANSON: HCPCS | Performed by: INTERNAL MEDICINE

## 2023-05-11 PROCEDURE — 78452 HT MUSCLE IMAGE SPECT MULT: CPT | Mod: 26 | Performed by: INTERNAL MEDICINE

## 2023-05-11 PROCEDURE — 343N000001 HC RX 343: Performed by: INTERNAL MEDICINE

## 2023-05-11 PROCEDURE — 93018 CV STRESS TEST I&R ONLY: CPT | Performed by: INTERNAL MEDICINE

## 2023-05-11 PROCEDURE — 93017 CV STRESS TEST TRACING ONLY: CPT

## 2023-05-11 PROCEDURE — 99239 HOSP IP/OBS DSCHRG MGMT >30: CPT | Performed by: INTERNAL MEDICINE

## 2023-05-11 RX ORDER — REGADENOSON 0.08 MG/ML
0.4 INJECTION, SOLUTION INTRAVENOUS ONCE
Status: COMPLETED | OUTPATIENT
Start: 2023-05-11 | End: 2023-05-11

## 2023-05-11 RX ORDER — ACETAMINOPHEN 325 MG/1
650 TABLET ORAL EVERY 4 HOURS PRN
Status: DISCONTINUED | OUTPATIENT
Start: 2023-05-11 | End: 2023-05-11 | Stop reason: HOSPADM

## 2023-05-11 RX ORDER — ACETAMINOPHEN 650 MG/1
650 SUPPOSITORY RECTAL EVERY 4 HOURS PRN
Status: DISCONTINUED | OUTPATIENT
Start: 2023-05-11 | End: 2023-05-11 | Stop reason: HOSPADM

## 2023-05-11 RX ORDER — AMINOPHYLLINE 25 MG/ML
50 INJECTION, SOLUTION INTRAVENOUS
Status: DISCONTINUED | OUTPATIENT
Start: 2023-05-11 | End: 2023-05-11 | Stop reason: HOSPADM

## 2023-05-11 RX ADMIN — INSULIN ASPART 1 UNITS: 100 INJECTION, SOLUTION INTRAVENOUS; SUBCUTANEOUS at 12:09

## 2023-05-11 RX ADMIN — ALLOPURINOL 100 MG: 100 TABLET ORAL at 08:18

## 2023-05-11 RX ADMIN — LEVOTHYROXINE SODIUM 75 MCG: 0.07 TABLET ORAL at 08:17

## 2023-05-11 RX ADMIN — ONDANSETRON 4 MG: 4 TABLET, ORALLY DISINTEGRATING ORAL at 12:20

## 2023-05-11 RX ADMIN — Medication 8.3 MILLICURIE: at 10:00

## 2023-05-11 RX ADMIN — PANTOPRAZOLE SODIUM 40 MG: 40 TABLET, DELAYED RELEASE ORAL at 08:18

## 2023-05-11 RX ADMIN — ACETAMINOPHEN 650 MG: 325 TABLET ORAL at 12:08

## 2023-05-11 RX ADMIN — INSULIN GLARGINE 30 UNITS: 100 INJECTION, SOLUTION SUBCUTANEOUS at 12:08

## 2023-05-11 RX ADMIN — LAMOTRIGINE 100 MG: 100 TABLET ORAL at 08:18

## 2023-05-11 RX ADMIN — REGADENOSON 0.4 MG: 0.08 INJECTION, SOLUTION INTRAVENOUS at 11:26

## 2023-05-11 RX ADMIN — HYDROCHLOROTHIAZIDE 12.5 MG: 12.5 TABLET ORAL at 08:17

## 2023-05-11 RX ADMIN — VALSARTAN 160 MG: 80 TABLET, FILM COATED ORAL at 08:17

## 2023-05-11 RX ADMIN — Medication 31.5 MILLICURIE: at 13:30

## 2023-05-11 ASSESSMENT — ACTIVITIES OF DAILY LIVING (ADL)
ADLS_ACUITY_SCORE: 31

## 2023-05-11 NOTE — PROGRESS NOTES
Nuclear Lexiscan Stress Test  Lexiscan 0.4mg IV was injected over 15 seconds   Typical vasodilator side effects noted, including nausea and headache that resolved by test end.  Interpretation pending.

## 2023-05-11 NOTE — PROGRESS NOTES
Called to [facility] talked with Amy and informed her that Note is in for admit going into . Oncoming nurse will be Ifeanyi Pineda.

## 2023-05-11 NOTE — PROGRESS NOTES
PRIMARY DIAGNOSIS: CHEST PAIN  OUTPATIENT/OBSERVATION GOALS TO BE MET BEFORE DISCHARGE:  1. Ruled out acute coronary syndrome (negative or stable Troponin):  No  2. Pain Status: Pain free.  3. Appropriate provocative testing performed: Yes  - Stress Test Procedure: Nuclear  - Interpretation of cardiac rhythm per telemetry tech: NSR    4. Cleared by Consultants (if applicable):No  5. Return to near baseline physical activity: Yes  Discharge Planner Nurse   Safe discharge environment identified: Yes  Barriers to discharge: Yes stress test today.    Pt. Alert and oriented. Using call light for all needs. On telemetry NSR. Pain free over the night. Up ambulating to the bathroom. Vital signs stable. Stress test in AM. Will continue to monitor.         Entered by: Ifeanyi Pineda RN 05/11/2023 4:35 AM     Please review provider order for any additional goals.   Nurse to notify provider when observation goals have been met and patient is ready for discharge.

## 2023-05-11 NOTE — PLAN OF CARE
PRIMARY DIAGNOSIS: CHEST PAIN  OUTPATIENT/OBSERVATION GOALS TO BE MET BEFORE DISCHARGE:  1. Ruled out acute coronary syndrome (negative or stable Troponin):  No  2. Pain Status: Pain free.  3. Appropriate provocative testing performed: Yes stress test in progress  - Stress Test Procedure: Nuclear  - Interpretation of cardiac rhythm per telemetry tech: sr    4. Cleared by Consultants (if applicable):N/A  5. Return to near baseline physical activity: Yes  Discharge Planner Nurse   Safe discharge environment identified: Yes  Barriers to discharge: Yes stress test results       Entered by: Naila Rodriguez RN 05/11/2023 11:11 AM     Please review provider order for any additional goals.   Nurse to notify provider when observation goals have been met and patient is ready for discharge.

## 2023-05-11 NOTE — PROGRESS NOTES
[facility] ED Handoff Report    ED Chief Complaint: Chest Pain: now resolved    ED Diagnosis:  (I10) Benign essential hypertension  Comment: Last /80  Plan: Pt has PRN Hydralazine for SBP >200    (R07.89) Atypical chest pain  Comment: Resolved  Plan: NM with Lexiscan Stress Test tomorrow       PMH:    Past Medical History:   Diagnosis Date     Seizures (H)         Code Status:  Full Code     Falls Risk: No Band: Not applicable    Current Living Situation/Residence: Alone     Elimination Status: Continent: Yes     Activity Level: Independent    Patients Preferred Language:  English     Needed: No    Vital Signs:  BP (!) 196/80 (BP Location: Right arm, Patient Position: Chair)   Pulse 80   Temp 98.2  F (36.8  C) (Oral)   Resp 29   Wt 86.2 kg (190 lb)   SpO2 97%   BMI 32.61 kg/m       Cardiac Rhythm: NSR    Pain Score: 0/10    Is the Patient Confused:  No    Last Food or Drink: 05/10/23     Focused Assessment:  Monitor BP    Tests Performed: Done: Labs and Imaging    Treatments Provided:  PRN Metoprolol    Family Dynamics/Concerns: No    Belongings Checklist Done and Signed by Patient: No    Medications sent with patient: None    Covid: asymptomatic , negative    Additional Information: Anjelica Rajan is a 83 year old female who presents with chest pain which has now resolved.  She has no symptoms currently.  She states she woke up  around 4:30 AM  with a slight tingling and achiness in her chin area and then it radiated down her left shoulder arm along with some substernal chest pressure.    Pt is Diabetic. Last .    RN: Cecilia Moss RN 5/10/2023 7:02 PM

## 2023-05-11 NOTE — PLAN OF CARE
PRIMARY DIAGNOSIS: CHEST PAIN  OUTPATIENT/OBSERVATION GOALS TO BE MET BEFORE DISCHARGE:  1. Ruled out acute coronary syndrome (negative or stable Troponin):  Yes  2. Pain Status: Pain free.  3. Appropriate provocative testing performed: Yes  - Stress Test Procedure: Leticia- RN notified MD of results.   - Interpretation of cardiac rhythm per telemetry tech: NSR    4. Cleared by Consultants (if applicable):N/A  5. Return to near baseline physical activity: Yes  Discharge Planner Nurse   Safe discharge environment identified: Yes  Barriers to discharge: No-  Pt feels okay discharging today, RN notified MD.       Entered by: Naila Rodriguez RN 05/11/2023 3:31 PM     Please review provider order for any additional goals.   Nurse to notify provider when observation goals have been met and patient is ready for discharge.  Goal Outcome Evaluation:      Plan of Care Reviewed With: patient    Overall Patient Progress: improvingOverall Patient Progress: improving

## 2023-05-11 NOTE — PROGRESS NOTES
Care Management Discharge Note    Discharge Date: 05/11/2023     Discharge Disposition:  Home    Discharge Services:  None    Discharge DME:  No new DME anticipated.     Discharge Transportation: family will provide    Private pay costs discussed: Not applicable (MOON reviewed in ED by JULIENNE RN CM)    Does the patient's insurance plan have a 3 day qualifying hospital stay waiver?  No/NA    PAS Confirmation Code:  NA  Patient/family educated on Medicare website which has current facility and service quality ratings:  NA    Education Provided on the Discharge Plan:  Per team  Persons Notified of Discharge Plans: Nursing;   Patient/Family in Agreement with the Plan:  Yes    Handoff Referral Completed: No    Additional Information:  No CM needs identified and care management will sign off but staff should feel free to call if discharge planning concerns arise.     Jeannette Schroeder RN

## 2023-05-11 NOTE — PLAN OF CARE
Goal Outcome Evaluation:      Plan of Care Reviewed With: patient    Overall Patient Progress: improvingOverall Patient Progress: improving    PRIMARY DIAGNOSIS: CHEST PAIN  OUTPATIENT/OBSERVATION GOALS TO BE MET BEFORE DISCHARGE:  1. Ruled out acute coronary syndrome (negative or stable Troponin):  No  2. Pain Status: Pain free.  3. Appropriate provocative testing performed: Yes  - Stress Test Procedure: Lesiscan In progress  - Interpretation of cardiac rhythm per telemetry tech: NSR    4. Cleared by Consultants (if applicable):N/A  5. Return to near baseline physical activity: Yes  Discharge Planner Nurse   Safe discharge environment identified: Yes  Barriers to discharge: Yes Stress test in progress       Entered by: Naila Rodriguez RN 05/11/2023 1:06 PM     Please review provider order for any additional goals.   Nurse to notify provider when observation goals have been met and patient is ready for discharge.

## 2023-05-12 LAB
ATRIAL RATE - MUSE: 92 BPM
DIASTOLIC BLOOD PRESSURE - MUSE: NORMAL MMHG
INTERPRETATION ECG - MUSE: NORMAL
P AXIS - MUSE: 60 DEGREES
PR INTERVAL - MUSE: 158 MS
QRS DURATION - MUSE: 98 MS
QT - MUSE: 382 MS
QTC - MUSE: 472 MS
R AXIS - MUSE: -52 DEGREES
SYSTOLIC BLOOD PRESSURE - MUSE: NORMAL MMHG
T AXIS - MUSE: 58 DEGREES
VENTRICULAR RATE- MUSE: 92 BPM

## 2023-05-16 ENCOUNTER — LAB REQUISITION (OUTPATIENT)
Dept: LAB | Facility: CLINIC | Age: 83
End: 2023-05-16
Payer: MEDICARE

## 2023-05-16 DIAGNOSIS — E11.65 TYPE 2 DIABETES MELLITUS WITH HYPERGLYCEMIA (H): ICD-10-CM

## 2023-05-16 PROCEDURE — 80048 BASIC METABOLIC PNL TOTAL CA: CPT | Mod: ORL | Performed by: PHYSICIAN ASSISTANT

## 2023-05-16 NOTE — DISCHARGE SUMMARY
"[facility]  Hospitalist Discharge Summary      Date of Admission:  5/10/2023  Date of Discharge:  5/11/2023  5:20 PM  Discharging Provider: Ebony Vincent MD  Discharge Service: Hospitalist Service    Discharge Diagnoses   Chest pain secondary to hypertensive emergency  Chronic kidney disease stage 3  Type 2 diabetes mellitus on long term insulin  Acquired hypothyroidism  Seizure disorder    Clinically Significant Risk Factors     # DMII: A1C = 8.8 % (Ref range: <5.7 %) within past 6 months  # Obesity: Estimated body mass index is 32.63 kg/m  as calculated from the following:    Height as of this encounter: 1.626 m (5' 4\").    Weight as of this encounter: 86.2 kg (190 lb 1.6 oz).       Follow-ups Needed After Discharge   Follow-up Appointments     Follow-up and recommended labs and tests       Follow up with primary care provider, [facility],   within 7 days for hospital follow- up.  No follow up labs or test are   needed.    Check your blood pressure in the morning and afternoon (2-4PM) and bring   the daily number in with you to your primary providers visit.             Unresulted Labs Ordered in the Past 30 Days of this Admission     No orders found from 4/10/2023 to 5/11/2023.      These results will be followed up by Ebony Vincent    Discharge Disposition   Discharged to home  Condition at discharge: Stable    Hospital Course   Mrs. Rajan is an 83 year old female with past medical history significan tfor HTN, CKD, and seizure disorder who presenting with chest pain:     Chest pain secondary to hypertensive emerbgency.: No known coronary artery disease but does have risk factors.  This awakened her at 430 this morning but is now gone. Initial blood pressure 235/118.  Initial troponin negative, second troponin mildly elevated.  EKG shows no acute ischemia. Stress test negative for inducible ischemia. Was given IV beta-blocker in the ED with improvement.  " Continued home HCTZ and valsartan with as needed IV hydralazine. Discussed checking blood pressure twice daily, possibly get new cuff as she is not sure where her cuff is and bring results to primary.      Chronic kidney disease stage III: Creatinine at baseline     Type 2 diabetes: Is on Lantus 30 units twice a day.     Acquired Hypothyroidism: continued home medication.     Seizure disorder: Continue home meds    Consultations This Hospital Stay   CARE MANAGEMENT / SOCIAL WORK IP CONSULT    Code Status   Prior    Time Spent on this Encounter   I, Ebony Vincent MD, personally saw the patient today and spent greater than 30 minutes discharging this patient.       Ebony Vincent MD  [facility]  [address] [place] [id number]  Phone: [phone]  Fax: [phone]  ______________________________________________________________________    Physical Exam   Vital Signs:                    Weight: 190 lbs 1.6 oz  General Appearance: Awake, alert, in no acute distress  Respiratory: CTAB, no wheeze  Cardiovascular: RRR, no murmur noted  GI: soft, nontender, non distended, normal bowel sounds  Skin: no jaundice, no rash         Primary Care Physician   [facility]    Discharge Orders      Reason for your hospital stay    Chest pain, paresthesias, and shoulder pain likely due to hypertensive emergency     Activity    Your activity upon discharge: activity as tolerated     Follow-up and recommended labs and tests     Follow up with primary care provider, [facility], within 7 days for hospital follow- up.  No follow up labs or test are needed.    Check your blood pressure in the morning and afternoon (2-4PM) and bring the daily number in with you to your primary providers visit.     Diet    Follow this diet upon discharge:Combination Diet Regular Diet Adult; Moderate Consistent Carb (60 g CHO per Meal) Diet; No Caffeine Diet        Significant Results and Procedures   Most Recent 3 CBC's:Recent Labs   Lab Test 05/10/23  0736 08/05/18  1414   WBC 10.5  --    HGB 13.5 13.9   MCV 80  --      --      Most Recent 3 BMP's:Recent Labs   Lab Test 05/11/23  1155 05/11/23  0821 05/10/23  2113 05/10/23  1359 05/10/23  0736 02/15/23  1057 12/13/22  1029   NA  --   --   --   --  143 140 140   POTASSIUM  --   --   --   --  4.0 4.4 4.0   CHLORIDE  --   --   --   --  106 101 101   CO2  --   --   --   --  24 23 24   BUN  --   --   --   --  17.2 19.9 21.4   CR  --   --   --   --  0.84 1.17* 1.18*   ANIONGAP  --   --   --   --  13 16* 15   SANDIP  --   --   --   --  9.3 8.9 9.1   * 147* 131*   < > 141* 201* 176*    < > = values in this interval not displayed.     Most Recent 2 LFT's:Recent Labs   Lab Test 05/10/23  0736 02/15/23  1057   AST 23 22   ALT 15 19   ALKPHOS 127* 133*   BILITOTAL 0.4 0.3     Most Recent 3 Troponin's:No lab results found.,   Results for orders placed or performed during the hospital encounter of 05/10/23   XR Chest 1 View    Narrative    EXAM: XR CHEST 1 VIEW  LOCATION: [facility]  DATE/TIME: 5/10/2023 8:12 AM CDT    INDICATION: Neck left arm pain  COMPARISON: 06/08/2018      Impression    IMPRESSION: Negative chest.   NM MPI w Lexiscan     Value    Pharmacologic Protocol Lexiscan    Test Type Pharmacological    Baseline HR 76    Baseline Systolic     Baseline Diastolic BP 77    Last Stress HR 90    Last Stress Systolic     Last Stress Diastolic BP 76    Target     PERCENT HR 85%    ST Deviation Elevation V3 0.1mm    Deviation Time II -0.2mm    ST Elevation Amount V2 0.5mm    ST Deviation Amount he aVR -0.3mm    Final Resting /79    Final Resting HR 87    Max Treadmill Speed 0.0    Max Treadmill Grade 0.0    Peak Systolic /79    Peak Diastolic /79    Max HR  97    Stress Phase Resting    Stress Resting Pt Position SUPINE    Current HR 77    Current /77     Stress Phase Stress    Stage Minute EXE 00:00    Exercise Stage STAGE 2    Current HR 74    Current /77    Stress Phase Stress    Stage Minute EXE 01:00    Exercise Stage STAGE 3    Current HR 75    Current /77    Stress Phase Stress    Stage Minute EXE 01:49    Exercise Stage STAGE 3    Current HR 97    Current /73    Stress Phase Stress    Stage Minute EXE 02:00    Exercise Stage STAGE 4    Current HR 96    Current /73    Stress Phase Stress    Stage Minute EXE 03:00    Exercise Stage STAGE 5    Current HR 93    Current /73    Stress Phase Stress    Stage Minute EXE 03:18    Exercise Stage STAGE 5    Current HR 91    Current /76    Stress Phase Stress    Stage Minute EXE 04:00    Exercise Stage STAGE 6    Current HR 90    Current /76    Stress Phase Stress    Stage Minute EXE 04:00    Exercise Stage STAGE 6    Current HR 90    Current /76    Stress Phase Recovery    Stage Minute REC 00:59    Exercise Stage Recovery    Current HR 90    Current /79    Stress Phase Recovery    Stage Minute REC 00:59    Exercise Stage Recovery    Current HR 90    Current /79    Stress Phase Recovery    Stage Minute REC 01:59    Exercise Stage Recovery    Current HR 89    Current /79    Stress Phase Recovery    Stage Minute REC 02:59    Exercise Stage Recovery    Current HR 89    Current /79    Stress Phase Recovery    Stage Minute REC 03:59    Exercise Stage Recovery    Current HR 87    Current /79    Stress Phase Recovery    Stage Minute REC 04:04    Exercise Stage Recovery    Current HR 87    Current /79    Max Predicted HR  70    Rate Pressure Product 16,005.0    Narrative       A prior study was conducted on 8/27/2012.  This study has no change   when compared with the prior study.     Pharmacologic regadenoson stress ECG is negative for inducible   myocardial ischemia.     Pharmacological regadenoson nuclear stress test is negative for    inducible myocardial ischemia or infarction.     Normal left ventricular size, wall motion and systolic function.    Calculated left ventricle ejection fraction is more than 70%.     The patient is at a low risk of future cardiac ischemic events.              Discharge Medications   Discharge Medication List as of 5/11/2023  4:42 PM      CONTINUE these medications which have NOT CHANGED    Details   allopurinol (ZYLOPRIM) 100 MG tablet Take 1 tablet by mouth 2 times daily, Historical      aspirin (ASA) 81 MG chewable tablet Take 81 mg by mouth At Bedtime, Historical      atorvastatin (LIPITOR) 40 MG tablet Take 1 tablet by mouth At Bedtime, Historical      calcium carbonate-vitamin D (OSCAL) 500-5 MG-MCG tablet Take 1 tablet by mouth At Bedtime, Historical      clindamycin (CLEOCIN T) 1 % external lotion Apply topically daily as needed (to face)Historical      glucosamine-chondroitin 500-400 MG CAPS per capsule Take 1 capsule by mouth At Bedtime, Historical      hydrochlorothiazide (HYDRODIURIL) 25 MG tablet Take 12.5 mg by mouth daily, Historical      ketoconazole (NIZORAL) 2 % external shampoo Apply topically daily as needed for itching or irritationHistorical      lamoTRIgine (LAMICTAL) 100 MG tablet Take 1 tablet (100 mg) by mouth 2 times daily, Disp-180 tablet, R-3, E-Prescribe      LANTUS SOLOSTAR 100 UNIT/ML soln Inject 30 Units Subcutaneous 2 times daily, SAYRA, HistoricalIf Lantus is not covered by insurance, may substitute Basaglar at same dose and frequency.        levothyroxine (TIROSINT) 75 MCG capsule Take 75 mcg by mouth daily, Historical      metroNIDAZOLE (METROCREAM) 0.75 % external cream Apply topically 2 times daily as needed (Rosecea)Historical      multivitamin w/minerals (THERA-VIT-M) tablet Take 1 tablet by mouth At Bedtime, Historical      omeprazole (PRILOSEC) 20 MG DR capsule Take 1 capsule by mouth daily, Historical      valsartan (DIOVAN) 160 MG tablet Take 160 mg by mouth daily,  Historical           Allergies   Allergies   Allergen Reactions     Glipizide      Other reaction(s): dizziness     Metformin Diarrhea

## 2023-05-17 LAB
ANION GAP SERPL CALCULATED.3IONS-SCNC: 16 MMOL/L (ref 7–15)
BUN SERPL-MCNC: 23.4 MG/DL (ref 8–23)
CALCIUM SERPL-MCNC: 9.1 MG/DL (ref 8.8–10.2)
CHLORIDE SERPL-SCNC: 103 MMOL/L (ref 98–107)
CREAT SERPL-MCNC: 1.02 MG/DL (ref 0.51–0.95)
DEPRECATED HCO3 PLAS-SCNC: 22 MMOL/L (ref 22–29)
GFR SERPL CREATININE-BSD FRML MDRD: 55 ML/MIN/1.73M2
GLUCOSE SERPL-MCNC: 261 MG/DL (ref 70–99)
POTASSIUM SERPL-SCNC: 4.1 MMOL/L (ref 3.4–5.3)
SODIUM SERPL-SCNC: 141 MMOL/L (ref 136–145)

## 2023-09-05 ENCOUNTER — LAB REQUISITION (OUTPATIENT)
Dept: LAB | Facility: CLINIC | Age: 83
End: 2023-09-05
Payer: MEDICARE

## 2023-09-05 DIAGNOSIS — E78.2 MIXED HYPERLIPIDEMIA: ICD-10-CM

## 2023-09-05 DIAGNOSIS — E03.9 HYPOTHYROIDISM, UNSPECIFIED: ICD-10-CM

## 2023-09-05 DIAGNOSIS — I10 ESSENTIAL (PRIMARY) HYPERTENSION: ICD-10-CM

## 2023-09-05 DIAGNOSIS — E11.65 TYPE 2 DIABETES MELLITUS WITH HYPERGLYCEMIA (H): ICD-10-CM

## 2023-09-05 LAB
ALBUMIN SERPL BCG-MCNC: 4 G/DL (ref 3.5–5.2)
ALP SERPL-CCNC: 119 U/L (ref 35–104)
ALT SERPL W P-5'-P-CCNC: 15 U/L (ref 0–50)
ANION GAP SERPL CALCULATED.3IONS-SCNC: 14 MMOL/L (ref 7–15)
AST SERPL W P-5'-P-CCNC: 18 U/L (ref 0–45)
BILIRUB SERPL-MCNC: 0.3 MG/DL
BUN SERPL-MCNC: 18.2 MG/DL (ref 8–23)
CALCIUM SERPL-MCNC: 9 MG/DL (ref 8.8–10.2)
CHLORIDE SERPL-SCNC: 104 MMOL/L (ref 98–107)
CHOLEST SERPL-MCNC: 140 MG/DL
CREAT SERPL-MCNC: 0.95 MG/DL (ref 0.51–0.95)
CREAT UR-MCNC: 99.9 MG/DL
DEPRECATED HCO3 PLAS-SCNC: 22 MMOL/L (ref 22–29)
GFR SERPL CREATININE-BSD FRML MDRD: 60 ML/MIN/1.73M2
GLUCOSE SERPL-MCNC: 218 MG/DL (ref 70–99)
HDLC SERPL-MCNC: 49 MG/DL
LDLC SERPL CALC-MCNC: 53 MG/DL
MICROALBUMIN UR-MCNC: 45.4 MG/L
MICROALBUMIN/CREAT UR: 45.45 MG/G CR (ref 0–25)
NONHDLC SERPL-MCNC: 91 MG/DL
POTASSIUM SERPL-SCNC: 4.2 MMOL/L (ref 3.4–5.3)
PROT SERPL-MCNC: 6.8 G/DL (ref 6.4–8.3)
SODIUM SERPL-SCNC: 140 MMOL/L (ref 136–145)
TRIGL SERPL-MCNC: 188 MG/DL
TSH SERPL DL<=0.005 MIU/L-ACNC: 4.14 UIU/ML (ref 0.3–4.2)

## 2023-09-05 PROCEDURE — 82570 ASSAY OF URINE CREATININE: CPT | Mod: ORL | Performed by: PHYSICIAN ASSISTANT

## 2023-09-05 PROCEDURE — 80053 COMPREHEN METABOLIC PANEL: CPT | Mod: ORL | Performed by: PHYSICIAN ASSISTANT

## 2023-09-05 PROCEDURE — 84443 ASSAY THYROID STIM HORMONE: CPT | Mod: ORL | Performed by: PHYSICIAN ASSISTANT

## 2023-09-05 PROCEDURE — 80061 LIPID PANEL: CPT | Mod: ORL | Performed by: PHYSICIAN ASSISTANT

## 2023-12-02 NOTE — NURSING NOTE
Telephone visit [phone]  Chief Complaint   Patient presents with     Epilepsy     Yearly follow up - doing good, no seizures.     Daly Hewitt RN on 12/15/2020 at 9:20 AM     hide

## 2023-12-22 NOTE — PROGRESS NOTES
NEUROLOGY FOLLOW UP VISIT  NOTE       [facility] NEUROLOGY [place]  [address] [place] [address]  Tel: [phone]  Fax: [phone]  [email or web address]     Anjelica Rajan,  1940, MRN [id number]  PCP: [facility]  Date: 2023      ASSESSMENT & PLAN     Visit Diagnosis  Simple partial seizure disorder (H)     Simple partial seizure  Pleasant 83-year-old female with HTN, HLD, DM with simple partial seizures manifesting with left-sided weakness and numbness.  Initially she was worked up for TIA but it was negative.  EEG showed right temporal sharp discharges.  She was diagnosed with simple partial seizure and started on Keppra that was subsequently switched to lamotrigine and has remained symptom-free since .  I have recommended:    1.  Continue lamotrigine 100 mg twice daily.  Prescriptions were filled  2.  Check lamotrigine level and hepatic profile  3.  Follow-up in 1 year    Thank you again for this referral, please feel free to contact me if you have any questions.    Kevyn Otero MD  [facility] NEUROLOGY, [place]  (Formerly, [facility]     HISTORY OF PRESENT ILLNESS     Patient is 83-year-old female with HTN, HLD, DM last seen on 2022 for simple partial seizures who returns for follow-up.  Her symptoms are well-controlled on low-dose of lamotrigine and prescriptions were filled during her last visit.  Lamotrigine level was ordered but not done.  Previous year she also did not get the lab work done and was apologetic and had promised that she will get it done.  She claims she had it done at her primary care physician's office and is somewhat surprised that they never mailed it to us.    Patient was initially evaluated in our clinic in  when she reported episodes of left sided weakness and facial numbness.  Initially she was worked up for transient ischemic attack and had MRI of the head and MRA  that was normal.  However, EEG showed right temporal sharp activity and she was diagnosed with focal seizures.  She was initially on Keppra and subsequently switched to lamotrigine in 2015.  This was done as he was feeling fatigued on Keppra.  According to patient the seizures during this episode she never loses consciousness.  She gets numbness tingling on the left side.  She has been symptom-free for more than 6 years.     PROBLEM LIST   Patient Active Problem List   Diagnosis Code    Simple partial seizure disorder (H) G40.109    Chronic kidney disease, stage 3a (H) N18.31    Benign essential hypertension I10    H/O Malignant melanoma Z85.820    Mixed hyperlipidemia E78.2    Type 2 diabetes mellitus without complication (H) E11.9    Acquired hypothyroidism E03.9         PAST MEDICAL & SURGICAL HISTORY     Past Medical History:   Patient  has a past medical history of Seizures (H).    Surgical History:  She  has a past surgical history that includes IR Renal Angiogram Bilateral (11/26/2002).     SOCIAL HISTORY     Reviewed, and she  reports that she has never smoked. She has never used smokeless tobacco. She reports that she does not drink alcohol and does not use drugs.     FAMILY HISTORY     Reviewed, and family history includes Heart Disease in her father.     ALLERGIES     Allergies   Allergen Reactions    Glipizide      Other reaction(s): dizziness    Metformin Diarrhea         REVIEW OF SYSTEMS     A 12 point review of system was performed and was negative except as outlined in the history of present illness.     HOME MEDICATIONS     Current Outpatient Rx   Medication Sig Dispense Refill    allopurinol (ZYLOPRIM) 100 MG tablet Take 1 tablet by mouth 2 times daily      aspirin (ASA) 81 MG chewable tablet Take 81 mg by mouth At Bedtime      atorvastatin (LIPITOR) 40 MG tablet Take 1 tablet by mouth At Bedtime      calcium carbonate-vitamin D (OSCAL) 500-5 MG-MCG tablet Take 1 tablet by mouth At Bedtime       "clindamycin (CLEOCIN T) 1 % external lotion Apply topically daily as needed (to face)      glucosamine-chondroitin 500-400 MG CAPS per capsule Take 1 capsule by mouth At Bedtime      hydrochlorothiazide (HYDRODIURIL) 25 MG tablet Take 12.5 mg by mouth daily      ketoconazole (NIZORAL) 2 % external shampoo Apply topically daily as needed for itching or irritation      lamoTRIgine (LAMICTAL) 100 MG tablet Take 1 tablet (100 mg) by mouth 2 times daily 180 tablet 3    LANTUS SOLOSTAR 100 UNIT/ML soln Inject 30 Units Subcutaneous 2 times daily      levothyroxine (TIROSINT) 75 MCG capsule Take 75 mcg by mouth daily      metroNIDAZOLE (METROCREAM) 0.75 % external cream Apply topically 2 times daily as needed (Rosecea)      multivitamin w/minerals (THERA-VIT-M) tablet Take 1 tablet by mouth At Bedtime      omeprazole (PRILOSEC) 20 MG DR capsule Take 1 capsule by mouth daily      valsartan (DIOVAN) 160 MG tablet Take 160 mg by mouth daily           PHYSICAL EXAM     Vital signs  BP (!) 154/86   Pulse 85   Ht 1.626 m (5' 4\")   Wt 90.7 kg (200 lb)   BMI 34.33 kg/m      Weight:   200 lbs 0 oz    Patient is alert and oriented x4 in no acute distress. Vital signs were reviewed and are documented in electronic medical record. Neck was supple, no carotid bruits, thyromegaly, JVD, or lymphadenopathy was noted.   NEUROLOGY EXAM:   Patient s speech was normal with no aphasia or dysarthria. Mentation, and affect were also normal.    Funduscopic exam was normal, with normal cup to disc ratio. Cranial nerves II -XII were intact.  Except she is hard of hearing   Patient had normal mass, tone and motor strength was 5/5 in all extremities without pronator drift.    Sensation was decreased to light touch pinprick below knees   Reflexes were 1+ upper extremity absent at knees and ankles   No dysmetria noted on FNF.   Gait testing was normal.      PERTINENT DIAGNOSTIC STUDIES     Following studies were reviewed:     MRI BRAIN " 12/16/2008  1.  No acute infarcts, mass lesions or hemorrhage  2.  Empty sella  3.  Mild to moderate cerebellar atrophy  4.  Perivascular space versus less likely small chronic lacunar infarct in the anterior right basal ganglia.  5.  Head MRA negative except for vascular variance as noted above     EEG 7/6/2010  This is a mildly abnormal EEG due to some rare very brief episodes of slowing in the temporal lobes independently.  1 of these episode was a little more sharp on the right consistent with a seizure focus in the right temporal head region     EEG 1/2/2009  This is an abnormal EEG due to intermittent bitemporal slowing and epileptiform discharges in the right temporal head region.  These findings are consistent with a seizure focus in the right temporal lobe, possibly also in the left.  The patient was contacted after this test was read and antiepileptic medications have been started     PERTINENT LABS  Following labs were reviewed:  No visits with results within 3 Month(s) from this visit.   Latest known visit with results is:   Lab Requisition on 09/05/2023   Component Date Value Ref Range Status    Creatinine Urine mg/dL 09/05/2023 99.9  mg/dL Final    Albumin Urine mg/L 09/05/2023 45.4  mg/L Final    Albumin Urine mg/g Cr 09/05/2023 45.45 (H)  0.00 - 25.00 mg/g Cr Final         Total time spent for face to face visit, reviewing labs/imaging studies, counseling and coordination of care was: 30 Minutes spent on the date of the encounter doing chart review, review of outside records, review of test results, interpretation of tests, patient visit, and documentation     This note was dictated using voice recognition software.  Any grammatical or context distortions are unintentional and inherent to the software.    Orders Placed This Encounter   Procedures    Lamotrigine Level    Hepatic function panel      New Prescriptions    No medications on file     Modified Medications    Modified Medication Previous  Medication    LAMOTRIGINE (LAMICTAL) 100 MG TABLET lamoTRIgine (LAMICTAL) 100 MG tablet       Take 1 tablet (100 mg) by mouth 2 times daily    Take 1 tablet (100 mg) by mouth 2 times daily

## 2023-12-27 ENCOUNTER — OFFICE VISIT (OUTPATIENT)
Dept: NEUROLOGY | Facility: CLINIC | Age: 83
End: 2023-12-27
Payer: MEDICARE

## 2023-12-27 ENCOUNTER — LAB (OUTPATIENT)
Dept: LAB | Facility: HOSPITAL | Age: 83
End: 2023-12-27
Payer: MEDICARE

## 2023-12-27 VITALS
SYSTOLIC BLOOD PRESSURE: 154 MMHG | BODY MASS INDEX: 34.15 KG/M2 | HEART RATE: 85 BPM | DIASTOLIC BLOOD PRESSURE: 86 MMHG | HEIGHT: 64 IN | WEIGHT: 200 LBS

## 2023-12-27 DIAGNOSIS — G40.109 SIMPLE PARTIAL SEIZURE DISORDER (H): ICD-10-CM

## 2023-12-27 DIAGNOSIS — G40.109 SIMPLE PARTIAL SEIZURE DISORDER (H): Primary | ICD-10-CM

## 2023-12-27 LAB
ALBUMIN SERPL BCG-MCNC: 3.9 G/DL (ref 3.5–5.2)
ALP SERPL-CCNC: 129 U/L (ref 40–150)
ALT SERPL W P-5'-P-CCNC: 15 U/L (ref 0–50)
AST SERPL W P-5'-P-CCNC: 17 U/L (ref 0–45)
BILIRUB DIRECT SERPL-MCNC: <0.2 MG/DL (ref 0–0.3)
BILIRUB SERPL-MCNC: 0.4 MG/DL
PROT SERPL-MCNC: 7.3 G/DL (ref 6.4–8.3)

## 2023-12-27 PROCEDURE — 80076 HEPATIC FUNCTION PANEL: CPT

## 2023-12-27 PROCEDURE — 99214 OFFICE O/P EST MOD 30 MIN: CPT | Performed by: PSYCHIATRY & NEUROLOGY

## 2023-12-27 PROCEDURE — 80175 DRUG SCREEN QUAN LAMOTRIGINE: CPT

## 2023-12-27 PROCEDURE — 36415 COLL VENOUS BLD VENIPUNCTURE: CPT

## 2023-12-27 RX ORDER — LAMOTRIGINE 100 MG/1
100 TABLET ORAL 2 TIMES DAILY
Qty: 180 TABLET | Refills: 3 | Status: SHIPPED | OUTPATIENT
Start: 2023-12-27

## 2023-12-27 NOTE — NURSING NOTE
Chief Complaint   Patient presents with    Seizures     Follow up     Agata Jones on 12/27/2023 at 10:43 AM

## 2023-12-27 NOTE — LETTER
2023         RE: Anjelica Rajan  [address]  [facility] [address]        Dear Colleague,    Thank you for referring your patient, Anjelica Rajan, to the [facility] NEUROLOGY [facility]IN[facility] [place]. Please see a copy of my visit note below.    NEUROLOGY FOLLOW UP VISIT  NOTE       [facility] NEUROLOGY [place]  [address] [place] [address]  Tel: [phone]  Fax: [phone]  [email or web address]     Anjelica Rajan,  1940, MRN [id number]  PCP: [facility]  Date: 2023      ASSESSMENT & PLAN     Visit Diagnosis  Simple partial seizure disorder (H)     Simple partial seizure  Pleasant 83-year-old female with HTN, HLD, DM with simple partial seizures manifesting with left-sided weakness and numbness.  Initially she was worked up for TIA but it was negative.  EEG showed right temporal sharp discharges.  She was diagnosed with simple partial seizure and started on Keppra that was subsequently switched to lamotrigine and has remained symptom-free since 2015.  I have recommended:    1.  Continue lamotrigine 100 mg twice daily.  Prescriptions were filled  2.  Check lamotrigine level and hepatic profile  3.  Follow-up in 1 year    Thank you again for this referral, please feel free to contact me if you have any questions.    Kevyn Otero MD  [facility]  ([facility]     HISTORY OF PRESENT ILLNESS     Patient is 83-year-old female with HTN, HLD, DM last seen on 2022 for simple partial seizures who returns for follow-up.  Her symptoms are well-controlled on low-dose of lamotrigine and prescriptions were filled during her last visit.  Lamotrigine level was ordered but not done.  Previous year she also did not get the lab work done and was apologetic and had promised that she will get it done.  She claims she had it done at her primary care physician's office and is somewhat  surprised that they never mailed it to us.    Patient was initially evaluated in our clinic in 2008 when she reported episodes of left sided weakness and facial numbness.  Initially she was worked up for transient ischemic attack and had MRI of the head and MRA that was normal.  However, EEG showed right temporal sharp activity and she was diagnosed with focal seizures.  She was initially on Keppra and subsequently switched to lamotrigine in 2015.  This was done as he was feeling fatigued on Keppra.  According to patient the seizures during this episode she never loses consciousness.  She gets numbness tingling on the left side.  She has been symptom-free for more than 6 years.     PROBLEM LIST   Patient Active Problem List   Diagnosis Code     Simple partial seizure disorder (H) G40.109     Chronic kidney disease, stage 3a (H) N18.31     Benign essential hypertension I10     H/O Malignant melanoma Z85.820     Mixed hyperlipidemia E78.2     Type 2 diabetes mellitus without complication (H) E11.9     Acquired hypothyroidism E03.9         PAST MEDICAL & SURGICAL HISTORY     Past Medical History:   Patient  has a past medical history of Seizures (H).    Surgical History:  She  has a past surgical history that includes IR Renal Angiogram Bilateral (11/26/2002).     SOCIAL HISTORY     Reviewed, and she  reports that she has never smoked. She has never used smokeless tobacco. She reports that she does not drink alcohol and does not use drugs.     FAMILY HISTORY     Reviewed, and family history includes Heart Disease in her father.     ALLERGIES     Allergies   Allergen Reactions     Glipizide      Other reaction(s): dizziness     Metformin Diarrhea         REVIEW OF SYSTEMS     A 12 point review of system was performed and was negative except as outlined in the history of present illness.     HOME MEDICATIONS     Current Outpatient Rx   Medication Sig Dispense Refill     allopurinol (ZYLOPRIM) 100 MG tablet Take 1 tablet  "by mouth 2 times daily       aspirin (ASA) 81 MG chewable tablet Take 81 mg by mouth At Bedtime       atorvastatin (LIPITOR) 40 MG tablet Take 1 tablet by mouth At Bedtime       calcium carbonate-vitamin D (OSCAL) 500-5 MG-MCG tablet Take 1 tablet by mouth At Bedtime       clindamycin (CLEOCIN T) 1 % external lotion Apply topically daily as needed (to face)       glucosamine-chondroitin 500-400 MG CAPS per capsule Take 1 capsule by mouth At Bedtime       hydrochlorothiazide (HYDRODIURIL) 25 MG tablet Take 12.5 mg by mouth daily       ketoconazole (NIZORAL) 2 % external shampoo Apply topically daily as needed for itching or irritation       lamoTRIgine (LAMICTAL) 100 MG tablet Take 1 tablet (100 mg) by mouth 2 times daily 180 tablet 3     LANTUS SOLOSTAR 100 UNIT/ML soln Inject 30 Units Subcutaneous 2 times daily       levothyroxine (TIROSINT) 75 MCG capsule Take 75 mcg by mouth daily       metroNIDAZOLE (METROCREAM) 0.75 % external cream Apply topically 2 times daily as needed (Rosecea)       multivitamin w/minerals (THERA-VIT-M) tablet Take 1 tablet by mouth At Bedtime       omeprazole (PRILOSEC) 20 MG DR capsule Take 1 capsule by mouth daily       valsartan (DIOVAN) 160 MG tablet Take 160 mg by mouth daily           PHYSICAL EXAM     Vital signs  BP (!) 154/86   Pulse 85   Ht 1.626 m (5' 4\")   Wt 90.7 kg (200 lb)   BMI 34.33 kg/m      Weight:   200 lbs 0 oz    Patient is alert and oriented x4 in no acute distress. Vital signs were reviewed and are documented in electronic medical record. Neck was supple, no carotid bruits, thyromegaly, JVD, or lymphadenopathy was noted.   NEUROLOGY EXAM:    Patient s speech was normal with no aphasia or dysarthria. Mentation, and affect were also normal.     Funduscopic exam was normal, with normal cup to disc ratio. Cranial nerves II -XII were intact.  Except she is hard of hearing    Patient had normal mass, tone and motor strength was 5/5 in all extremities without pronator " drift.     Sensation was decreased to light touch pinprick below knees    Reflexes were 1+ upper extremity absent at knees and ankles    No dysmetria noted on FNF.    Gait testing was normal.      PERTINENT DIAGNOSTIC STUDIES     Following studies were reviewed:     MRI BRAIN 12/16/2008  1.  No acute infarcts, mass lesions or hemorrhage  2.  Empty sella  3.  Mild to moderate cerebellar atrophy  4.  Perivascular space versus less likely small chronic lacunar infarct in the anterior right basal ganglia.  5.  Head MRA negative except for vascular variance as noted above     EEG 7/6/2010  This is a mildly abnormal EEG due to some rare very brief episodes of slowing in the temporal lobes independently.  1 of these episode was a little more sharp on the right consistent with a seizure focus in the right temporal head region     EEG 1/2/2009  This is an abnormal EEG due to intermittent bitemporal slowing and epileptiform discharges in the right temporal head region.  These findings are consistent with a seizure focus in the right temporal lobe, possibly also in the left.  The patient was contacted after this test was read and antiepileptic medications have been started     PERTINENT LABS  Following labs were reviewed:  No visits with results within 3 Month(s) from this visit.   Latest known visit with results is:   Lab Requisition on 09/05/2023   Component Date Value Ref Range Status     Creatinine Urine mg/dL 09/05/2023 99.9  mg/dL Final     Albumin Urine mg/L 09/05/2023 45.4  mg/L Final     Albumin Urine mg/g Cr 09/05/2023 45.45 (H)  0.00 - 25.00 mg/g Cr Final         Total time spent for face to face visit, reviewing labs/imaging studies, counseling and coordination of care was: 30 Minutes spent on the date of the encounter doing chart review, review of outside records, review of test results, interpretation of tests, patient visit, and documentation     This note was dictated using voice recognition software.  Any  grammatical or context distortions are unintentional and inherent to the software.    Orders Placed This Encounter   Procedures     Lamotrigine Level     Hepatic function panel      New Prescriptions    No medications on file     Modified Medications    Modified Medication Previous Medication    LAMOTRIGINE (LAMICTAL) 100 MG TABLET lamoTRIgine (LAMICTAL) 100 MG tablet       Take 1 tablet (100 mg) by mouth 2 times daily    Take 1 tablet (100 mg) by mouth 2 times daily                 Again, thank you for allowing me to participate in the care of your patient.        Sincerely,        Kevyn Otero MD

## 2023-12-28 LAB — LAMOTRIGINE SERPL-MCNC: 7.5 UG/ML

## 2024-03-19 ENCOUNTER — APPOINTMENT (OUTPATIENT)
Dept: CT IMAGING | Facility: HOSPITAL | Age: 84
End: 2024-03-19
Attending: EMERGENCY MEDICINE
Payer: MEDICARE

## 2024-03-19 ENCOUNTER — HOSPITAL ENCOUNTER (EMERGENCY)
Facility: HOSPITAL | Age: 84
Discharge: HOME | End: 2024-03-19
Attending: EMERGENCY MEDICINE | Admitting: EMERGENCY MEDICINE
Payer: MEDICARE

## 2024-03-19 VITALS
WEIGHT: 199.2 LBS | HEIGHT: 65 IN | OXYGEN SATURATION: 97 % | BODY MASS INDEX: 33.19 KG/M2 | HEART RATE: 72 BPM | DIASTOLIC BLOOD PRESSURE: 84 MMHG | RESPIRATION RATE: 24 BRPM | SYSTOLIC BLOOD PRESSURE: 168 MMHG | TEMPERATURE: 98.1 F

## 2024-03-19 DIAGNOSIS — I15.8 OTHER SECONDARY HYPERTENSION: ICD-10-CM

## 2024-03-19 DIAGNOSIS — M25.512 ACUTE PAIN OF LEFT SHOULDER: ICD-10-CM

## 2024-03-19 DIAGNOSIS — N83.8 OVARIAN ENLARGEMENT, RIGHT: ICD-10-CM

## 2024-03-19 DIAGNOSIS — R91.1 PULMONARY NODULE: ICD-10-CM

## 2024-03-19 LAB
ANION GAP SERPL CALCULATED.3IONS-SCNC: 10 MMOL/L (ref 7–15)
BUN SERPL-MCNC: 15.8 MG/DL (ref 8–23)
CALCIUM SERPL-MCNC: 9.4 MG/DL (ref 8.8–10.2)
CHLORIDE SERPL-SCNC: 99 MMOL/L (ref 98–107)
CREAT SERPL-MCNC: 0.94 MG/DL (ref 0.51–0.95)
DEPRECATED HCO3 PLAS-SCNC: 32 MMOL/L (ref 22–29)
EGFRCR SERPLBLD CKD-EPI 2021: 61 ML/MIN/1.73M2
ERYTHROCYTE [DISTWIDTH] IN BLOOD BY AUTOMATED COUNT: 16.2 % (ref 10–15)
GLUCOSE SERPL-MCNC: 153 MG/DL (ref 70–99)
HCT VFR BLD AUTO: 41.8 % (ref 35–47)
HGB BLD-MCNC: 13 G/DL (ref 11.7–15.7)
HOLD SPECIMEN: NORMAL
HOLD SPECIMEN: NORMAL
MCH RBC QN AUTO: 25.4 PG (ref 26.5–33)
MCHC RBC AUTO-ENTMCNC: 31.1 G/DL (ref 31.5–36.5)
MCV RBC AUTO: 82 FL (ref 78–100)
PLATELET # BLD AUTO: 304 10E3/UL (ref 150–450)
POTASSIUM SERPL-SCNC: 3.6 MMOL/L (ref 3.4–5.3)
RBC # BLD AUTO: 5.12 10E6/UL (ref 3.8–5.2)
SODIUM SERPL-SCNC: 141 MMOL/L (ref 135–145)
TROPONIN T SERPL HS-MCNC: 14 NG/L
WBC # BLD AUTO: 10.2 10E3/UL (ref 4–11)

## 2024-03-19 PROCEDURE — 71275 CT ANGIOGRAPHY CHEST: CPT | Mod: MA

## 2024-03-19 PROCEDURE — 85027 COMPLETE CBC AUTOMATED: CPT | Performed by: EMERGENCY MEDICINE

## 2024-03-19 PROCEDURE — 250N000011 HC RX IP 250 OP 636: Performed by: EMERGENCY MEDICINE

## 2024-03-19 PROCEDURE — 84484 ASSAY OF TROPONIN QUANT: CPT | Performed by: EMERGENCY MEDICINE

## 2024-03-19 PROCEDURE — 36415 COLL VENOUS BLD VENIPUNCTURE: CPT | Performed by: EMERGENCY MEDICINE

## 2024-03-19 PROCEDURE — 82374 ASSAY BLOOD CARBON DIOXIDE: CPT | Performed by: EMERGENCY MEDICINE

## 2024-03-19 PROCEDURE — 93005 ELECTROCARDIOGRAM TRACING: CPT | Performed by: EMERGENCY MEDICINE

## 2024-03-19 PROCEDURE — 99285 EMERGENCY DEPT VISIT HI MDM: CPT | Mod: 25

## 2024-03-19 RX ORDER — IOPAMIDOL 755 MG/ML
90 INJECTION, SOLUTION INTRAVASCULAR ONCE
Status: COMPLETED | OUTPATIENT
Start: 2024-03-19 | End: 2024-03-19

## 2024-03-19 RX ORDER — LABETALOL HYDROCHLORIDE 5 MG/ML
20 INJECTION, SOLUTION INTRAVENOUS
Status: DISCONTINUED | OUTPATIENT
Start: 2024-03-19 | End: 2024-03-19 | Stop reason: HOSPADM

## 2024-03-19 RX ADMIN — IOPAMIDOL 90 ML: 755 INJECTION, SOLUTION INTRAVENOUS at 13:53

## 2024-03-19 ASSESSMENT — COLUMBIA-SUICIDE SEVERITY RATING SCALE - C-SSRS
6. HAVE YOU EVER DONE ANYTHING, STARTED TO DO ANYTHING, OR PREPARED TO DO ANYTHING TO END YOUR LIFE?: NO
2. HAVE YOU ACTUALLY HAD ANY THOUGHTS OF KILLING YOURSELF IN THE PAST MONTH?: NO
1. IN THE PAST MONTH, HAVE YOU WISHED YOU WERE DEAD OR WISHED YOU COULD GO TO SLEEP AND NOT WAKE UP?: NO

## 2024-03-19 ASSESSMENT — ACTIVITIES OF DAILY LIVING (ADL)
ADLS_ACUITY_SCORE: 35

## 2024-03-19 NOTE — DISCHARGE INSTRUCTIONS
Please continue taking Tylenol, using ice for relief.  I would expect symptoms to slowly improve day by day.  Her symptoms are almost certainly due to some inflammation of the shoulder due to arthritis.  Use pain as your guide as you recover, try not to push through any significant pain, and try not to do any overhead movements.    Otherwise the CT scan showed pulmonary nodules, a couple of spots, on your lung.  This just needs follow-up with your primary care doctor.    CT scan also showed enlargement of your right ovary, it is not specifically diagnostic for anything in particular but will likely need follow-up as well.

## 2024-03-19 NOTE — ED PROVIDER NOTES
EMERGENCY DEPARTMENT ENCOUNTER      NAME: Anjelica Rajan  AGE: 83 year old female  YOB: 1940  MRN: [id number]  EVALUATION DATE & TIME: 3/19/2024 11:27 AM    PCP: [facility]    ED PROVIDER: Max Thibodeaux M.D.      Chief Complaint   Patient presents with    Hypertension         FINAL IMPRESSION:  1. Acute pain of left shoulder    2. Other secondary hypertension    3. Pulmonary nodule    4. Ovarian enlargement, right          ED COURSE & MEDICAL DECISION MAKING:    Pertinent Labs & Imaging studies reviewed below.  All EKGs below represent my independent interpretation.   ED Course as of 03/19/24 1449   Tue Mar 19, 2024   1146 84 yo F who is here with 5 days of blurred vision, headache, nausea. L shoulder pain which has now spread down left arm to left flank. Jaw discomfort this morning. No chest pain or tightness. BP is 223/100 on arrival. Decreased ROM to left shoulder due to shoulder discomfort.    1204 Patient is relatively asymptomatic on arrival here.  No abdominal pain.  Good sensation and posterior pulse to left arm.  She does report some tingling however.  Hypertension, left arm, shoulder, back pain, plan to get a CTA for aortic dissection screening   1205 EKG shows sinus rhythm with a rate of 85.  No acute ischemic ST or T wave morphology.  Left axis deviation.  Normal intervals.  When compared to prior EKG on May 10, 2023, there is no significant change.  Impression: Sinus rhythm without evidence of acute ischemia.   1233 Troponin T, High Sensitivity: 14 (acs ruled out given timing of sx, clinical picture overall)   1336 BP(!): 167/76   1420 CTA Chest Abdomen Pelvis w Contrast  .  Normal caliber thoracoabdominal aorta. No acute aortic syndrome. Mild mixed attenuation aortic plaque but no critical stenosis.  2.  No specific findings that would explain left shoulder/arm and flank pain.  3.  Incidental pulmonary nodules along the posterior costal pleura the larger of which  measures 7 x 10 mm. Per 2017 Fleischner Society guidelines, follow-up CT of the chest in 12 months (March 2025) is suggested for reassessment.  4.  Enlargement of the right ovary with complexity/multiple cystic spaces measuring up to 3.4 x 5.9 cm. Epithelial ovarian neoplasm is in the differential diagnosis. Further characterization with pelvic ultrasound or MRI is suggested.   1427 I updated the patient on reassuring CTA, however I also discussed incidental findings of pulmonary nodules and her right ovarian enlargement.  She will follow-up with her primary care doctor in regards to these findings.  We discussed pain management with ice, rest, and she may need PT if not improving.  She sees her primary care doctor.     Additional ED Course Timestamps:  11:37 AM The medical student met with the patient to gather history and to perform their initial exam.   11:51 AM I met with the patient to gather additional history and to perform my exam. I discussed the plan for care while in the Emergency Department.  2:22 PM I rechecked and updated the patient. We discussed the plan for discharge and the patient is agreeable. Reviewed supportive cares, symptomatic treatment, outpatient follow up, and reasons to return to the Emergency Department. All questions and concerns were addressed. Patient to be discharged by ED RN.     Medical Decision Making  Obtained supplemental history:Supplemental history obtained?: No  Reviewed external records: External records reviewed?: Documented in chart and Inpatient Record: 5/10/23 - 5/11/23 at [facility] for benign essential hypertension, atypical chest pain  Care impacted by chronic illness:Chronic Kidney Disease, Diabetes, Hyperlipidemia, Hypertension, and Other: simple partial seizure disorder, gout  Care significantly affected by social determinants of health:N/A  Did you consider but not order tests?: Work up considered but not performed and documented in chart, if applicable  Did you  interpret images independently?: Independent interpretation of ECG and images noted in documentation, when applicable.  Consultation discussion with other provider:Did you involve another provider (consultant, , pharmacy, etc.)?: No  Discharge. No recommendations on prescription strength medication(s). N/A.    At the conclusion of the encounter I discussed the results of all of the tests and the disposition. The questions were answered. The patient or family acknowledged understanding and was agreeable with the care plan.       MEDICATIONS GIVEN IN THE EMERGENCY:  Medications   labetalol (NORMODYNE/TRANDATE) injection 20 mg (has no administration in time range)   iopamidol (ISOVUE-370) solution 90 mL (90 mLs Intravenous $Given 3/19/24 0183)         NEW PRESCRIPTIONS STARTED AT TODAY'S ER VISIT  New Prescriptions    No medications on file          =================================================================    HPI    Anjelica Rajan is a 83 year old female who presents to this ED for evaluation of hypertension. Patient reports developing sudden left shoulder pain on 3/14/24 that has since radiated down her left arm and into the left chest. During the initial onset of her pain, she also notes blurry vision, headache, and nausea that has since gone away. Patient also endorses tremulous jaw. She originally believed she had heartburn so she took Tums with no relief in her symptoms. Patient took her blood pressure at home which was 190 systolic which prompted her visit to the ED today. She also notes recent elevated blood sugar with her history of T2D.    Patient denies shortness of breath, lightheadedness, cough, congestion, abdominal pain, rash, dizziness, abnormal urinary symptoms, abnormal bowel symptoms. Denies history of similar symptoms or known sick contacts.     Per chart review, patient was see from 5/10/23 - 5/11/23 at [facility] for benign essential hypertension, atypical chest pain. Chest pain secondary to  "hypertensive emergency.  Initial blood pressure 235/118. Initial troponin negative, second troponin mildly elevated.  EKG shows no acute ischemia. Stress test negative for inducible ischemia. Was given IV beta-blocker in the ED with improvement. Continued home HCTZ and valsartan with as needed IV hydralazine. Patient discharged home.       VITALS:  BP (!) 169/76   Pulse 69   Temp 98.1  F (36.7  C) (Oral)   Resp 21   Ht 1.638 m (5' 4.5\")   Wt 90.4 kg (199 lb 3.2 oz)   SpO2 96%   BMI 33.66 kg/m      PHYSICAL EXAM    Constitutional: Well developed, well nourished. Comfortable appearing.  HENT: Atraumatic, mucous membranes moist, nose normal. Neck- Supple, gross ROM intact.   Eyes: Pupils mid-range, conjunctiva without injection, no discharge.   Respiratory: Clear to auscultation bilaterally, no respiratory distress, no wheezing, speaks full sentences easily. No cough.  Cardiovascular: 2+ radial pulse to left hand, Normal heart rate, regular rhythm, no murmurs.   GI: Soft, no tenderness to deep palpation in all quadrants, no masses.  Musculoskeletal: Moving all 4 extremities intentionally and without pain. No obvious deformity.  Skin: Warm, dry, no rash.  Neurologic:  5/5 strength to left hand, Alert & oriented x 3, cranial nerves grossly intact.  Psychiatric: Affect normal, cooperative.      I, Jose Miguel Dotson  am serving as a scribe to document services personally performed by Dr. Max Thibodeaux based on my observation and the provider's statements to me. I, Max Thibodeaux MD attest that Jose Miguel Dotson  is acting in a scribe capacity, has observed my performance of the services and has documented them in accordance with my direction.    Max Thibodeaux M.D.  Emergency Medicine  [facility] EMERGENCY DEPARTMENT  [facility] [address] [phone]  Dept: [phone]       Max Thibodeaux MD  03/19/24 1451    "

## 2024-03-20 LAB
ATRIAL RATE - MUSE: 85 BPM
DIASTOLIC BLOOD PRESSURE - MUSE: NORMAL MMHG
INTERPRETATION ECG - MUSE: NORMAL
P AXIS - MUSE: 40 DEGREES
PR INTERVAL - MUSE: 158 MS
QRS DURATION - MUSE: 102 MS
QT - MUSE: 376 MS
QTC - MUSE: 447 MS
R AXIS - MUSE: -58 DEGREES
SYSTOLIC BLOOD PRESSURE - MUSE: NORMAL MMHG
T AXIS - MUSE: 47 DEGREES
VENTRICULAR RATE- MUSE: 85 BPM

## 2024-04-08 ENCOUNTER — LAB REQUISITION (OUTPATIENT)
Dept: LAB | Facility: CLINIC | Age: 84
End: 2024-04-08
Payer: MEDICARE

## 2024-04-08 DIAGNOSIS — N83.209 UNSPECIFIED OVARIAN CYST, UNSPECIFIED SIDE: ICD-10-CM

## 2024-04-08 DIAGNOSIS — Z12.4 ENCOUNTER FOR SCREENING FOR MALIGNANT NEOPLASM OF CERVIX: ICD-10-CM

## 2024-04-08 DIAGNOSIS — R93.89 ABNORMAL FINDINGS ON DIAGNOSTIC IMAGING OF OTHER SPECIFIED BODY STRUCTURES: ICD-10-CM

## 2024-04-08 PROCEDURE — 86304 IMMUNOASSAY TUMOR CA 125: CPT | Mod: ORL | Performed by: OBSTETRICS & GYNECOLOGY

## 2024-04-08 PROCEDURE — 87624 HPV HI-RISK TYP POOLED RSLT: CPT | Mod: ORL | Performed by: OBSTETRICS & GYNECOLOGY

## 2024-04-08 PROCEDURE — 88305 TISSUE EXAM BY PATHOLOGIST: CPT | Mod: TC,ORL | Performed by: OBSTETRICS & GYNECOLOGY

## 2024-04-08 PROCEDURE — G0123 SCREEN CERV/VAG THIN LAYER: HCPCS | Mod: ORL | Performed by: OBSTETRICS & GYNECOLOGY

## 2024-04-08 PROCEDURE — 88305 TISSUE EXAM BY PATHOLOGIST: CPT | Mod: 26 | Performed by: STUDENT IN AN ORGANIZED HEALTH CARE EDUCATION/TRAINING PROGRAM

## 2024-04-09 ENCOUNTER — LAB REQUISITION (OUTPATIENT)
Dept: LAB | Facility: CLINIC | Age: 84
End: 2024-04-09
Payer: MEDICARE

## 2024-04-09 DIAGNOSIS — E11.65 TYPE 2 DIABETES MELLITUS WITH HYPERGLYCEMIA (H): ICD-10-CM

## 2024-04-09 LAB — CANCER AG125 SERPL-ACNC: 33 U/ML

## 2024-04-09 PROCEDURE — 80048 BASIC METABOLIC PNL TOTAL CA: CPT | Mod: ORL | Performed by: PHYSICIAN ASSISTANT

## 2024-04-10 LAB
ANION GAP SERPL CALCULATED.3IONS-SCNC: 14 MMOL/L (ref 7–15)
BKR LAB AP GYN ADEQUACY: NORMAL
BKR LAB AP GYN INTERPRETATION: NORMAL
BKR LAB AP HPV REFLEX: NORMAL
BKR LAB AP LMP: NORMAL
BKR LAB AP PREVIOUS ABNL DX: NORMAL
BKR LAB AP PREVIOUS ABNORMAL: NORMAL
BUN SERPL-MCNC: 12.5 MG/DL (ref 8–23)
CALCIUM SERPL-MCNC: 8.9 MG/DL (ref 8.8–10.2)
CHLORIDE SERPL-SCNC: 101 MMOL/L (ref 98–107)
CREAT SERPL-MCNC: 0.98 MG/DL (ref 0.51–0.95)
DEPRECATED HCO3 PLAS-SCNC: 27 MMOL/L (ref 22–29)
EGFRCR SERPLBLD CKD-EPI 2021: 58 ML/MIN/1.73M2
GLUCOSE SERPL-MCNC: 166 MG/DL (ref 70–99)
PATH REPORT.COMMENTS IMP SPEC: NORMAL
PATH REPORT.FINAL DX SPEC: NORMAL
PATH REPORT.GROSS SPEC: NORMAL
PATH REPORT.MICROSCOPIC SPEC OTHER STN: NORMAL
PATH REPORT.RELEVANT HX SPEC: NORMAL
PATH REPORT.RELEVANT HX SPEC: NORMAL
PHOTO IMAGE: NORMAL
POTASSIUM SERPL-SCNC: 4.2 MMOL/L (ref 3.4–5.3)
SODIUM SERPL-SCNC: 142 MMOL/L (ref 135–145)

## 2024-04-12 LAB
HUMAN PAPILLOMA VIRUS 16 DNA: NEGATIVE
HUMAN PAPILLOMA VIRUS 18 DNA: NEGATIVE
HUMAN PAPILLOMA VIRUS FINAL DIAGNOSIS: NORMAL
HUMAN PAPILLOMA VIRUS OTHER HR: NEGATIVE

## 2024-04-15 ENCOUNTER — LAB REQUISITION (OUTPATIENT)
Dept: LAB | Facility: CLINIC | Age: 84
End: 2024-04-15
Payer: MEDICARE

## 2024-04-15 DIAGNOSIS — E11.8 TYPE 2 DIABETES MELLITUS WITH UNSPECIFIED COMPLICATIONS (H): ICD-10-CM

## 2024-04-15 LAB — HBA1C MFR BLD: 9.2 %

## 2024-04-15 PROCEDURE — 83036 HEMOGLOBIN GLYCOSYLATED A1C: CPT | Mod: ORL | Performed by: OBSTETRICS & GYNECOLOGY

## 2024-04-25 ENCOUNTER — DOCUMENTATION ONLY (OUTPATIENT)
Dept: OTHER | Facility: CLINIC | Age: 84
End: 2024-04-25

## 2024-04-25 ENCOUNTER — ANESTHESIA (OUTPATIENT)
Dept: SURGERY | Facility: CLINIC | Age: 84
End: 2024-04-25
Payer: MEDICARE

## 2024-04-25 ENCOUNTER — HOSPITAL ENCOUNTER (OUTPATIENT)
Facility: CLINIC | Age: 84
Discharge: HOME | End: 2024-04-26
Attending: OBSTETRICS & GYNECOLOGY | Admitting: OBSTETRICS & GYNECOLOGY
Payer: MEDICARE

## 2024-04-25 ENCOUNTER — ANESTHESIA EVENT (OUTPATIENT)
Dept: SURGERY | Facility: CLINIC | Age: 84
End: 2024-04-25
Payer: MEDICARE

## 2024-04-25 DIAGNOSIS — N83.209 CYST OF OVARY, UNSPECIFIED LATERALITY: Primary | ICD-10-CM

## 2024-04-25 LAB
GLUCOSE BLDC GLUCOMTR-MCNC: 100 MG/DL (ref 70–99)
GLUCOSE BLDC GLUCOMTR-MCNC: 130 MG/DL (ref 70–99)
GLUCOSE BLDC GLUCOMTR-MCNC: 131 MG/DL (ref 70–99)
GLUCOSE BLDC GLUCOMTR-MCNC: 167 MG/DL (ref 70–99)

## 2024-04-25 PROCEDURE — 82962 GLUCOSE BLOOD TEST: CPT

## 2024-04-25 PROCEDURE — 250N000013 HC RX MED GY IP 250 OP 250 PS 637: Performed by: OBSTETRICS & GYNECOLOGY

## 2024-04-25 PROCEDURE — 999N000141 HC STATISTIC PRE-PROCEDURE NURSING ASSESSMENT: Performed by: OBSTETRICS & GYNECOLOGY

## 2024-04-25 PROCEDURE — 258N000003 HC RX IP 258 OP 636: Performed by: ANESTHESIOLOGY

## 2024-04-25 PROCEDURE — 250N000012 HC RX MED GY IP 250 OP 636 PS 637: Performed by: EMERGENCY MEDICINE

## 2024-04-25 PROCEDURE — 250N000013 HC RX MED GY IP 250 OP 250 PS 637: Performed by: EMERGENCY MEDICINE

## 2024-04-25 PROCEDURE — 88112 CYTOPATH CELL ENHANCE TECH: CPT | Mod: TC | Performed by: OBSTETRICS & GYNECOLOGY

## 2024-04-25 PROCEDURE — 370N000017 HC ANESTHESIA TECHNICAL FEE, PER MIN: Performed by: OBSTETRICS & GYNECOLOGY

## 2024-04-25 PROCEDURE — 272N000001 HC OR GENERAL SUPPLY STERILE: Performed by: OBSTETRICS & GYNECOLOGY

## 2024-04-25 PROCEDURE — 250N000013 HC RX MED GY IP 250 OP 250 PS 637: Performed by: ANESTHESIOLOGY

## 2024-04-25 PROCEDURE — 250N000011 HC RX IP 250 OP 636: Performed by: OBSTETRICS & GYNECOLOGY

## 2024-04-25 PROCEDURE — 250N000025 HC SEVOFLURANE, PER MIN: Performed by: OBSTETRICS & GYNECOLOGY

## 2024-04-25 PROCEDURE — 250N000009 HC RX 250: Performed by: NURSE ANESTHETIST, CERTIFIED REGISTERED

## 2024-04-25 PROCEDURE — 99204 OFFICE O/P NEW MOD 45 MIN: CPT | Performed by: EMERGENCY MEDICINE

## 2024-04-25 PROCEDURE — 710N000010 HC RECOVERY PHASE 1, LEVEL 2, PER MIN: Performed by: OBSTETRICS & GYNECOLOGY

## 2024-04-25 PROCEDURE — 250N000011 HC RX IP 250 OP 636: Performed by: NURSE ANESTHETIST, CERTIFIED REGISTERED

## 2024-04-25 PROCEDURE — 250N000011 HC RX IP 250 OP 636: Performed by: ANESTHESIOLOGY

## 2024-04-25 PROCEDURE — 88307 TISSUE EXAM BY PATHOLOGIST: CPT | Mod: TC | Performed by: OBSTETRICS & GYNECOLOGY

## 2024-04-25 PROCEDURE — 360N000077 HC SURGERY LEVEL 4, PER MIN: Performed by: OBSTETRICS & GYNECOLOGY

## 2024-04-25 RX ORDER — NALOXONE HYDROCHLORIDE 0.4 MG/ML
0.4 INJECTION, SOLUTION INTRAMUSCULAR; INTRAVENOUS; SUBCUTANEOUS
Status: DISCONTINUED | OUTPATIENT
Start: 2024-04-25 | End: 2024-04-26 | Stop reason: HOSPADM

## 2024-04-25 RX ORDER — BISACODYL 10 MG
10 SUPPOSITORY, RECTAL RECTAL DAILY PRN
Status: DISCONTINUED | OUTPATIENT
Start: 2024-04-25 | End: 2024-04-26 | Stop reason: HOSPADM

## 2024-04-25 RX ORDER — FENTANYL CITRATE 50 UG/ML
25 INJECTION, SOLUTION INTRAMUSCULAR; INTRAVENOUS EVERY 5 MIN PRN
Status: DISCONTINUED | OUTPATIENT
Start: 2024-04-25 | End: 2024-04-25 | Stop reason: HOSPADM

## 2024-04-25 RX ORDER — PROPOFOL 10 MG/ML
INJECTION, EMULSION INTRAVENOUS PRN
Status: DISCONTINUED | OUTPATIENT
Start: 2024-04-25 | End: 2024-04-25

## 2024-04-25 RX ORDER — LAMOTRIGINE 100 MG/1
100 TABLET ORAL 2 TIMES DAILY
Status: DISCONTINUED | OUTPATIENT
Start: 2024-04-25 | End: 2024-04-26 | Stop reason: HOSPADM

## 2024-04-25 RX ORDER — ONDANSETRON 4 MG/1
4 TABLET, ORALLY DISINTEGRATING ORAL EVERY 30 MIN PRN
Status: DISCONTINUED | OUTPATIENT
Start: 2024-04-25 | End: 2024-04-25 | Stop reason: HOSPADM

## 2024-04-25 RX ORDER — LIDOCAINE 40 MG/G
CREAM TOPICAL
Status: DISCONTINUED | OUTPATIENT
Start: 2024-04-25 | End: 2024-04-25 | Stop reason: HOSPADM

## 2024-04-25 RX ORDER — PROCHLORPERAZINE MALEATE 5 MG
5 TABLET ORAL EVERY 6 HOURS PRN
Status: DISCONTINUED | OUTPATIENT
Start: 2024-04-25 | End: 2024-04-26 | Stop reason: HOSPADM

## 2024-04-25 RX ORDER — ONDANSETRON 4 MG/1
4 TABLET, ORALLY DISINTEGRATING ORAL EVERY 6 HOURS PRN
Status: DISCONTINUED | OUTPATIENT
Start: 2024-04-25 | End: 2024-04-26 | Stop reason: HOSPADM

## 2024-04-25 RX ORDER — ONDANSETRON 2 MG/ML
4 INJECTION INTRAMUSCULAR; INTRAVENOUS EVERY 6 HOURS PRN
Status: DISCONTINUED | OUTPATIENT
Start: 2024-04-25 | End: 2024-04-26 | Stop reason: HOSPADM

## 2024-04-25 RX ORDER — VALSARTAN 80 MG/1
160 TABLET ORAL DAILY
Status: DISCONTINUED | OUTPATIENT
Start: 2024-04-26 | End: 2024-04-26 | Stop reason: HOSPADM

## 2024-04-25 RX ORDER — LEVOTHYROXINE SODIUM 75 UG/1
75 TABLET ORAL DAILY
Status: DISCONTINUED | OUTPATIENT
Start: 2024-04-25 | End: 2024-04-26 | Stop reason: HOSPADM

## 2024-04-25 RX ORDER — FENTANYL CITRATE 50 UG/ML
50 INJECTION, SOLUTION INTRAMUSCULAR; INTRAVENOUS EVERY 5 MIN PRN
Status: DISCONTINUED | OUTPATIENT
Start: 2024-04-25 | End: 2024-04-25 | Stop reason: HOSPADM

## 2024-04-25 RX ORDER — HALOPERIDOL 5 MG/ML
1 INJECTION INTRAMUSCULAR
Status: DISCONTINUED | OUTPATIENT
Start: 2024-04-25 | End: 2024-04-25 | Stop reason: HOSPADM

## 2024-04-25 RX ORDER — PANTOPRAZOLE SODIUM 40 MG/1
40 TABLET, DELAYED RELEASE ORAL
Status: DISCONTINUED | OUTPATIENT
Start: 2024-04-26 | End: 2024-04-26 | Stop reason: HOSPADM

## 2024-04-25 RX ORDER — LIDOCAINE 40 MG/G
CREAM TOPICAL
Status: DISCONTINUED | OUTPATIENT
Start: 2024-04-25 | End: 2024-04-26 | Stop reason: HOSPADM

## 2024-04-25 RX ORDER — ACETAMINOPHEN 325 MG/1
975 TABLET ORAL ONCE
Status: DISCONTINUED | OUTPATIENT
Start: 2024-04-25 | End: 2024-04-25 | Stop reason: HOSPADM

## 2024-04-25 RX ORDER — DIPHENHYDRAMINE HYDROCHLORIDE 50 MG/ML
12.5 INJECTION INTRAMUSCULAR; INTRAVENOUS EVERY 6 HOURS PRN
Status: DISCONTINUED | OUTPATIENT
Start: 2024-04-25 | End: 2024-04-25 | Stop reason: HOSPADM

## 2024-04-25 RX ORDER — ACETAMINOPHEN 325 MG/1
975 TABLET ORAL ONCE
Status: COMPLETED | OUTPATIENT
Start: 2024-04-25 | End: 2024-04-25

## 2024-04-25 RX ORDER — VALSARTAN 80 MG/1
80 TABLET ORAL DAILY
Qty: 1 TABLET | Refills: 0 | Status: COMPLETED | OUTPATIENT
Start: 2024-04-25 | End: 2024-04-25

## 2024-04-25 RX ORDER — ACETAMINOPHEN 325 MG/1
975 TABLET ORAL EVERY 6 HOURS
Status: DISCONTINUED | OUTPATIENT
Start: 2024-04-25 | End: 2024-04-25

## 2024-04-25 RX ORDER — NALOXONE HYDROCHLORIDE 0.4 MG/ML
0.2 INJECTION, SOLUTION INTRAMUSCULAR; INTRAVENOUS; SUBCUTANEOUS
Status: DISCONTINUED | OUTPATIENT
Start: 2024-04-25 | End: 2024-04-26 | Stop reason: HOSPADM

## 2024-04-25 RX ORDER — DIPHENHYDRAMINE HCL 12.5 MG/5ML
12.5 SOLUTION ORAL EVERY 6 HOURS PRN
Status: DISCONTINUED | OUTPATIENT
Start: 2024-04-25 | End: 2024-04-25 | Stop reason: HOSPADM

## 2024-04-25 RX ORDER — NICOTINE POLACRILEX 4 MG
15-30 LOZENGE BUCCAL
Status: DISCONTINUED | OUTPATIENT
Start: 2024-04-25 | End: 2024-04-26 | Stop reason: HOSPADM

## 2024-04-25 RX ORDER — NALOXONE HYDROCHLORIDE 0.4 MG/ML
0.1 INJECTION, SOLUTION INTRAMUSCULAR; INTRAVENOUS; SUBCUTANEOUS
Status: DISCONTINUED | OUTPATIENT
Start: 2024-04-25 | End: 2024-04-25 | Stop reason: HOSPADM

## 2024-04-25 RX ORDER — ACETAMINOPHEN 325 MG/1
975 TABLET ORAL EVERY 6 HOURS
Status: DISCONTINUED | OUTPATIENT
Start: 2024-04-25 | End: 2024-04-26 | Stop reason: HOSPADM

## 2024-04-25 RX ORDER — HYDROMORPHONE HCL IN WATER/PF 6 MG/30 ML
0.2 PATIENT CONTROLLED ANALGESIA SYRINGE INTRAVENOUS EVERY 5 MIN PRN
Status: DISCONTINUED | OUTPATIENT
Start: 2024-04-25 | End: 2024-04-25 | Stop reason: HOSPADM

## 2024-04-25 RX ORDER — ALLOPURINOL 100 MG/1
100 TABLET ORAL 2 TIMES DAILY
Status: DISCONTINUED | OUTPATIENT
Start: 2024-04-25 | End: 2024-04-26 | Stop reason: HOSPADM

## 2024-04-25 RX ORDER — DEXTROSE MONOHYDRATE 25 G/50ML
25-50 INJECTION, SOLUTION INTRAVENOUS
Status: DISCONTINUED | OUTPATIENT
Start: 2024-04-25 | End: 2024-04-26 | Stop reason: HOSPADM

## 2024-04-25 RX ORDER — HYDROMORPHONE HCL IN WATER/PF 6 MG/30 ML
0.4 PATIENT CONTROLLED ANALGESIA SYRINGE INTRAVENOUS EVERY 5 MIN PRN
Status: DISCONTINUED | OUTPATIENT
Start: 2024-04-25 | End: 2024-04-25 | Stop reason: HOSPADM

## 2024-04-25 RX ORDER — HYDROCHLOROTHIAZIDE 12.5 MG/1
12.5 TABLET ORAL DAILY
Status: DISCONTINUED | OUTPATIENT
Start: 2024-04-26 | End: 2024-04-26 | Stop reason: ALTCHOICE

## 2024-04-25 RX ORDER — FENTANYL CITRATE 50 UG/ML
INJECTION, SOLUTION INTRAMUSCULAR; INTRAVENOUS PRN
Status: DISCONTINUED | OUTPATIENT
Start: 2024-04-25 | End: 2024-04-25

## 2024-04-25 RX ORDER — SODIUM CHLORIDE, SODIUM LACTATE, POTASSIUM CHLORIDE, CALCIUM CHLORIDE 600; 310; 30; 20 MG/100ML; MG/100ML; MG/100ML; MG/100ML
INJECTION, SOLUTION INTRAVENOUS CONTINUOUS
Status: DISCONTINUED | OUTPATIENT
Start: 2024-04-25 | End: 2024-04-25 | Stop reason: HOSPADM

## 2024-04-25 RX ORDER — HYDROMORPHONE HYDROCHLORIDE 2 MG/1
2 TABLET ORAL EVERY 4 HOURS PRN
Status: DISCONTINUED | OUTPATIENT
Start: 2024-04-25 | End: 2024-04-26 | Stop reason: HOSPADM

## 2024-04-25 RX ORDER — ONDANSETRON 2 MG/ML
4 INJECTION INTRAMUSCULAR; INTRAVENOUS EVERY 30 MIN PRN
Status: DISCONTINUED | OUTPATIENT
Start: 2024-04-25 | End: 2024-04-25 | Stop reason: HOSPADM

## 2024-04-25 RX ORDER — ACETAMINOPHEN 325 MG/1
650 TABLET ORAL EVERY 6 HOURS
Status: DISCONTINUED | OUTPATIENT
Start: 2024-04-28 | End: 2024-04-26 | Stop reason: HOSPADM

## 2024-04-25 RX ORDER — HYDROMORPHONE HYDROCHLORIDE 2 MG/1
4 TABLET ORAL EVERY 4 HOURS PRN
Status: DISCONTINUED | OUTPATIENT
Start: 2024-04-25 | End: 2024-04-26 | Stop reason: HOSPADM

## 2024-04-25 RX ORDER — AMOXICILLIN 250 MG
1 CAPSULE ORAL 2 TIMES DAILY
Status: DISCONTINUED | OUTPATIENT
Start: 2024-04-25 | End: 2024-04-26 | Stop reason: HOSPADM

## 2024-04-25 RX ORDER — ATORVASTATIN CALCIUM 40 MG/1
40 TABLET, FILM COATED ORAL AT BEDTIME
Status: DISCONTINUED | OUTPATIENT
Start: 2024-04-25 | End: 2024-04-26 | Stop reason: HOSPADM

## 2024-04-25 RX ORDER — HYDROMORPHONE HCL IN WATER/PF 6 MG/30 ML
0.2 PATIENT CONTROLLED ANALGESIA SYRINGE INTRAVENOUS
Status: DISCONTINUED | OUTPATIENT
Start: 2024-04-25 | End: 2024-04-26 | Stop reason: HOSPADM

## 2024-04-25 RX ORDER — ONDANSETRON 2 MG/ML
INJECTION INTRAMUSCULAR; INTRAVENOUS PRN
Status: DISCONTINUED | OUTPATIENT
Start: 2024-04-25 | End: 2024-04-25

## 2024-04-25 RX ORDER — ACETAMINOPHEN 325 MG/1
650 TABLET ORAL EVERY 6 HOURS
Status: DISCONTINUED | OUTPATIENT
Start: 2024-04-28 | End: 2024-04-25

## 2024-04-25 RX ORDER — BUPIVACAINE HYDROCHLORIDE 2.5 MG/ML
INJECTION, SOLUTION INFILTRATION; PERINEURAL PRN
Status: DISCONTINUED | OUTPATIENT
Start: 2024-04-25 | End: 2024-04-25 | Stop reason: HOSPADM

## 2024-04-25 RX ORDER — HYDROMORPHONE HCL IN WATER/PF 6 MG/30 ML
0.1 PATIENT CONTROLLED ANALGESIA SYRINGE INTRAVENOUS
Status: DISCONTINUED | OUTPATIENT
Start: 2024-04-25 | End: 2024-04-26 | Stop reason: HOSPADM

## 2024-04-25 RX ADMIN — SODIUM CHLORIDE, POTASSIUM CHLORIDE, SODIUM LACTATE AND CALCIUM CHLORIDE: 600; 310; 30; 20 INJECTION, SOLUTION INTRAVENOUS at 11:00

## 2024-04-25 RX ADMIN — ROCURONIUM BROMIDE 50 MG: 10 INJECTION, SOLUTION INTRAVENOUS at 10:35

## 2024-04-25 RX ADMIN — PROCHLORPERAZINE EDISYLATE 5 MG: 5 INJECTION INTRAMUSCULAR; INTRAVENOUS at 14:12

## 2024-04-25 RX ADMIN — HYDROMORPHONE HYDROCHLORIDE 0.2 MG: 0.2 INJECTION, SOLUTION INTRAMUSCULAR; INTRAVENOUS; SUBCUTANEOUS at 15:50

## 2024-04-25 RX ADMIN — FENTANYL CITRATE 50 MCG: 50 INJECTION, SOLUTION INTRAMUSCULAR; INTRAVENOUS at 12:41

## 2024-04-25 RX ADMIN — SODIUM CHLORIDE, POTASSIUM CHLORIDE, SODIUM LACTATE AND CALCIUM CHLORIDE: 600; 310; 30; 20 INJECTION, SOLUTION INTRAVENOUS at 08:19

## 2024-04-25 RX ADMIN — PROPOFOL 100 MCG/KG/MIN: 10 INJECTION, EMULSION INTRAVENOUS at 10:35

## 2024-04-25 RX ADMIN — PROPOFOL 150 MG: 10 INJECTION, EMULSION INTRAVENOUS at 10:43

## 2024-04-25 RX ADMIN — ONDANSETRON 4 MG: 2 INJECTION INTRAMUSCULAR; INTRAVENOUS at 12:35

## 2024-04-25 RX ADMIN — ACETAMINOPHEN 975 MG: 325 TABLET ORAL at 07:34

## 2024-04-25 RX ADMIN — SUGAMMADEX 200 MG: 100 INJECTION, SOLUTION INTRAVENOUS at 11:40

## 2024-04-25 RX ADMIN — LEVOTHYROXINE SODIUM 75 MCG: 75 TABLET ORAL at 14:12

## 2024-04-25 RX ADMIN — ALLOPURINOL 100 MG: 100 TABLET ORAL at 20:51

## 2024-04-25 RX ADMIN — LAMOTRIGINE 100 MG: 100 TABLET ORAL at 20:51

## 2024-04-25 RX ADMIN — FENTANYL CITRATE 50 MCG: 50 INJECTION INTRAMUSCULAR; INTRAVENOUS at 10:43

## 2024-04-25 RX ADMIN — ACETAMINOPHEN 975 MG: 325 TABLET ORAL at 18:40

## 2024-04-25 RX ADMIN — ONDANSETRON 4 MG: 2 INJECTION INTRAMUSCULAR; INTRAVENOUS at 11:13

## 2024-04-25 RX ADMIN — SENNOSIDES AND DOCUSATE SODIUM 1 TABLET: 8.6; 5 TABLET ORAL at 20:51

## 2024-04-25 RX ADMIN — LIDOCAINE HYDROCHLORIDE 30 MG: 10 INJECTION, SOLUTION EPIDURAL; INFILTRATION; INTRACAUDAL; PERINEURAL at 10:43

## 2024-04-25 RX ADMIN — PROPOFOL 20 MG: 10 INJECTION, EMULSION INTRAVENOUS at 11:41

## 2024-04-25 RX ADMIN — INSULIN GLARGINE 20 UNITS: 100 INJECTION, SOLUTION SUBCUTANEOUS at 22:55

## 2024-04-25 RX ADMIN — VALSARTAN 80 MG: 80 TABLET, FILM COATED ORAL at 15:50

## 2024-04-25 RX ADMIN — FENTANYL CITRATE 50 MCG: 50 INJECTION INTRAMUSCULAR; INTRAVENOUS at 10:30

## 2024-04-25 RX ADMIN — FENTANYL CITRATE 50 MCG: 50 INJECTION, SOLUTION INTRAMUSCULAR; INTRAVENOUS at 12:32

## 2024-04-25 RX ADMIN — ATORVASTATIN CALCIUM 40 MG: 40 TABLET, FILM COATED ORAL at 22:33

## 2024-04-25 RX ADMIN — HYDROMORPHONE HYDROCHLORIDE 2 MG: 2 TABLET ORAL at 22:48

## 2024-04-25 RX ADMIN — HYDROMORPHONE HYDROCHLORIDE 0.2 MG: 0.2 INJECTION, SOLUTION INTRAMUSCULAR; INTRAVENOUS; SUBCUTANEOUS at 21:05

## 2024-04-25 ASSESSMENT — ACTIVITIES OF DAILY LIVING (ADL)
ADLS_ACUITY_SCORE: 28
ADLS_ACUITY_SCORE: 32
ADLS_ACUITY_SCORE: 32
ADLS_ACUITY_SCORE: 36
ADLS_ACUITY_SCORE: 32
ADLS_ACUITY_SCORE: 28
ADLS_ACUITY_SCORE: 28
ADLS_ACUITY_SCORE: 32
ADLS_ACUITY_SCORE: 28
ADLS_ACUITY_SCORE: 32
ADLS_ACUITY_SCORE: 28
ADLS_ACUITY_SCORE: 28
ADLS_ACUITY_SCORE: 32
ADLS_ACUITY_SCORE: 28

## 2024-04-25 ASSESSMENT — ENCOUNTER SYMPTOMS: SEIZURES: 1

## 2024-04-25 NOTE — OP NOTE
PROCEDURE NOTE - HYSTEROSCOPY AND DILATION AND CURETTAGE     Name: Anjelica Rajan   : 1940   MRN: [id number]     DATE OF SERVICE:  2024     PREOPERATIVE DIAGNOSIS:   Right ovarian cyst  Thickened endometrium    POSTOPERATIVE DIAGNOSIS:   Right ovarian cyst  Thickened endometrium    PROCEDURES:   Bilateral salpingo-oophorectomy  Hysteroscopy, Dilatation and Curettage  Polypectomy    SURGEON: Winsome Gallardo MD     ASSISTANT: OR staff    ANESTHESIA:  general    ESTIMATED BLOOD LOSS:  20 cc    HISTORY OF PRESENT ILLNESS:  This is a 84 year old female with history of right ovarian cyst and thickened endometrium.  She had an endometrial biopsy prior to the procedure that was negative.  She had a normal .  She had a transvaginal ultrasound prior to which showed a thickened endometrium of 13.9 mm.  She was given informed consent for the above procedure. The risks, benefits and alternatives were discussed with her including but not exclusive to uterine perforation, bleeding and infection.  We discussed if the cancer was noted she would need further surgery.  She expressed understanding and wished to proceed.      PROCEDURE:  Patient underwent induction of a general anesthetic, she was carefully prepped and draped in sterile dorsal lithotomy position for the procedure. Timeout was performed. Bladder was drained with a Holland catheter.   A sterile speculum was placed.  The anterior lip of the cervix was grasped with a single tooth tenaculum.  A uterine manipulator was placed into the uterus.     Attention was turned to the abdomen. An incision above the umbilicus was made. A Veress needle was introduced with a 2 pop technique, saline drop test confirmed adequate placement. Opening pressure was 3-4 mm Hg. Insufflation was done until 15 mm of pressure was established. A 5 nonbladed trocar was placed above the umbilicus. Two more port sights were place in the right and left lower quadrants under direct  visualization. [facility] assistance was then used.     The procedure began with identifying the anatomy.   The uterus was normal.  The left tube and ovary were normal.  The right ovary was enlarged and cystic.  The right tube was normal.    Pelvic washings were obtained.  A LigaSure was then placed in the left tube and ovary were taken.  The left IP ligament was identified.  The ureter was well below the ligament.  I clamped cauterized and cut the IP ligament.  The left utero-ovarian ligament was clamped cauterized and cut.  The left fallopian tube was clamped cauterized and cut.  The tube and ovary were placed in the anterior cul-de-sac.  The pedicle was hemostatic.    The right tube and ovary were then removed.  The utero-ovarian ligament was clamped cauterized and cut.  The fallopian tube was then clamped cauterized and cut.  The right IP ligament was clamped cauterized and cut.  The ureter was noted to be well below the ligament prior to cauterized the IP ligament.    The 5 trocar was removed.  A 10 trocar was placed under direct visualization.  The left tube and ovary were placed in a 5 bag.  The trocar was removed.  The tube and ovary were removed in the bag.  The trocar was replaced under direct visualization.  The pedicles were examined and were hemostatic.    The 10 bag was then placed through the left lower quadrant trocar.  The tube and ovary were placed in the bag.  The bag was brought to the anterior abdominal wall.  The cyst was drained and the tube and ovary were removed and the 10 bag.  The Nikhil Monge fascial closure device was then placed in the left lower quadrant.  The fascia was closed with 0 Vicryl suture.  The remaining trocars were removed under direct visualization.  Skin of all 3 incisions were closed with 4-0 Monocryl.  Glue was applied.    I then performed a hysteroscopy.  A sterile speculum was placed in the vagina.  The uterine manipulator was removed.  The cervix was serially  dilated with Hegar dilators to a 5 Hegar.  The hysteroscope was then inserted.  There was a large endometrial polyp noted.  A MyoSure device was inserted and the polyp was resected under direct visualization.  All 4 quadrants of the uterus were then sampled with the MyoSure device.  Normal tubal ostia were visualized bilaterally.  The MyoSure device was removed.  The hysteroscope was removed.  The tenaculum was removed and the site was noted to be hemostatic.  The speculum was then removed.  The Holland catheter was removed.      The fluid deficit was less than 100 cc    There were no surgical or anesthetic complications.  Counts were correct at the end of the case x 2.  She was transferred to the recovery room in stable condition.    Winsome Gallardo MD, FACOG  P) [phone]    CC: [facility], [facility], Winsome Gallardo MD

## 2024-04-25 NOTE — PLAN OF CARE
Problem: Pain Acute  Goal: Optimal Pain Control and Function  Intervention: Develop Pain Management Plan  Recent Flowsheet Documentation  Taken 4/25/2024 1550 by Bethanie Barrientos RN  Pain Management Interventions: medication (see MAR)     Problem: Comorbidity Management  Goal: Blood Pressure in Desired Range  Intervention: Maintain Blood Pressure Management  Recent Flowsheet Documentation  Taken 4/25/2024 1323 by Bethanie Barrientos RN  Medication Review/Management: medications reviewed     Problem: Comorbidity Management  Goal: Blood Glucose Levels Within Targeted Range  Intervention: Monitor and Manage Glycemia  Recent Flowsheet Documentation  Taken 4/25/2024 1323 by Bethanie Barrientos RN  Medication Review/Management: medications reviewed     Pt A/O x 4. VSS on RA. Pain in abdomen primarily Lt side requiring IV dilaudid this shift with good relief per patient. Voiding spontaneously. Tolerating clear liquids at this time. Did have some nausea this shift. Managing with prn compazine with good relief. Resting comfortably in bed. Alarms on for  safety. Call light in reach.     Bethanie Barrientos RN

## 2024-04-25 NOTE — ANESTHESIA PREPROCEDURE EVALUATION
Anesthesia Pre-Procedure Evaluation    Patient: Anjelica Rajan   MRN: [id number] : 1940        Procedure : Procedure(s):  ROBOTIC LAPAROSCOPIC BILATERAL SALPINGO-OOPHORECTOMY,  HYSTEROSCOPY, DILATION AND CURETTAGE          Past Medical History:   Diagnosis Date     Diabetes (H)      Hypertension      Motion sickness      PONV (postoperative nausea and vomiting)      Seizures (H)      Thyroid disease       Past Surgical History:   Procedure Laterality Date     CAROTID ENDARTERECTOMY       CATARACT EXTRACTION, BILATERAL       COLONOSCOPY       EYE SURGERY       GYN SURGERY       IR RENAL ANGIOGRAM BILATERAL  2002      Allergies   Allergen Reactions     Glipizide      Other reaction(s): dizziness     Metformin Diarrhea      Social History     Tobacco Use     Smoking status: Never     Smokeless tobacco: Never   Substance Use Topics     Alcohol use: Never      Wt Readings from Last 1 Encounters:   24 90.4 kg (199 lb 3.2 oz)        Anesthesia Evaluation   Pt has had prior anesthetic.         ROS/MED HX  ENT/Pulmonary:  - neg pulmonary ROS     Neurologic:     (+)       seizures,                         Cardiovascular:     (+) Dyslipidemia hypertension- -   -  - -                                      METS/Exercise Tolerance:     Hematologic:  - neg hematologic  ROS     Musculoskeletal:  - neg musculoskeletal ROS     GI/Hepatic:  - neg GI/hepatic ROS     Renal/Genitourinary:     (+) renal disease, type: CRI,            Endo:  - neg endo ROS   (+)  type II DM,   Using insulin,     thyroid problem, hypothyroidism,    Obesity,       Psychiatric/Substance Use:  - neg psychiatric ROS     Infectious Disease:  - neg infectious disease ROS     Malignancy:  - neg malignancy ROS     Other:  - neg other ROS        Physical Exam    Airway        Mallampati: II   TM distance: < 3 FB   Neck ROM: full   Mouth opening: > 3 cm    Respiratory Devices and Support         Dental  no notable dental history     (+) Multiple  "crowns, permanant bridges      Cardiovascular   cardiovascular exam normal       Rhythm and rate: regular and normal     Pulmonary   pulmonary exam normal        breath sounds clear to auscultation       OUTSIDE LABS:  CBC:   Lab Results   Component Value Date    WBC 10.2 03/19/2024    WBC 10.5 05/10/2023    HGB 13.0 03/19/2024    HGB 13.5 05/10/2023    HCT 41.8 03/19/2024    HCT 42.8 05/10/2023     03/19/2024     05/10/2023     BMP:   Lab Results   Component Value Date     04/09/2024     03/19/2024    POTASSIUM 4.2 04/09/2024    POTASSIUM 3.6 03/19/2024    CHLORIDE 101 04/09/2024    CHLORIDE 99 03/19/2024    CO2 27 04/09/2024    CO2 32 (H) 03/19/2024    BUN 12.5 04/09/2024    BUN 15.8 03/19/2024    CR 0.98 (H) 04/09/2024    CR 0.94 03/19/2024     (H) 04/25/2024     (H) 04/09/2024     COAGS: No results found for: \"PTT\", \"INR\", \"FIBR\"  POC: No results found for: \"BGM\", \"HCG\", \"HCGS\"  HEPATIC:   Lab Results   Component Value Date    ALBUMIN 3.9 12/27/2023    PROTTOTAL 7.3 12/27/2023    ALT 15 12/27/2023    AST 17 12/27/2023    ALKPHOS 129 12/27/2023    BILITOTAL 0.4 12/27/2023     OTHER:   Lab Results   Component Value Date    A1C 9.2 (H) 04/15/2024    SANDIP 8.9 04/09/2024    MAG 1.9 05/10/2023    TSH 4.14 09/05/2023       Anesthesia Plan    ASA Status:  3    NPO Status:  NPO Appropriate    Anesthesia Type: General.     - Airway: ETT   Induction: Intravenous, Propofol.   Maintenance: Balanced.        Consents    Anesthesia Plan(s) and associated risks, benefits, and realistic alternatives discussed. Questions answered and patient/representative(s) expressed understanding.     - Discussed:     - Discussed with:  Patient      - Extended Intubation/Ventilatory Support Discussed: Yes.      - Patient is DNR/DNI Status: No     Use of blood products discussed: Yes.     - Discussed with: Patient.     Postoperative Care    Pain management: Multi-modal analgesia.   PONV prophylaxis: " Ondansetron (or other 5HT-3), Dexamethasone or Solumedrol, Background Propofol Infusion     Comments:             Eriberto Carter MD    I have reviewed the pertinent notes and labs in the chart from the past 30 days and (re)examined the patient.  Any updates or changes from those notes are reflected in this note.             # Drug Induced Platelet Defect: home medication list includes an antiplatelet medication  # DMII: A1C = 9.2 % (Ref range: <5.7 %) within past 6 months

## 2024-04-25 NOTE — INTERVAL H&P NOTE
H+P Update     Doing well. No changes in history.      BP (!) 189/87   Temp 98.6  F (37  C) (Temporal)   Resp 16   SpO2 97%       NAD, AAO x 3  Abdomen soft, non tender    A/P: 84 year old with Cyst of ovary [N83.209] and thickened endometrium here for surgical management  -- Met with patient and discussed the risks of surgery including bleeding, infection, damage to pelvic organs and need for further surgery. Previously discussed risk of cancer and need for further surgery.   -- Questions answered  -- Consent signed  -- Abdomen marked  -- To OR for Procedure(s):  ROBOTIC LAPAROSCOPIC BILATERAL SALPINGO-OOPHORECTOMY,  HYSTEROSCOPY, DILATION AND CURETTAGE     Winsome Gallardo MD  (P) [phone]

## 2024-04-25 NOTE — BRIEF OP NOTE
[facility]    Brief Operative Note    Pre-operative diagnosis:   Cyst of ovary [N83.209]    Post-operative diagnosis:  Right ovarian cyst  Endometrial polyp    Procedure:   LAPAROSCOPIC BILATERAL SALPINGO-OOPHORECTOMY,, Bilateral - Abdomen  HYSTEROSCOPY, DILATION AND CURETTAGE, Polypectomy, N/A - Vagina    Surgeon: Surgeons and Role:     * Winsome Gallardo MD - Primary    Anesthesia: General     Estimated Blood Loss: 20 cc    Drains:  None    Specimens:   ID Type Source Tests Collected by Time Destination   1 :  W[place]ings Pelvis NON-GYNECOLOGIC CYTOLOGY Winsome Gallardo MD 4/25/2024 11:04 AM    2 : Left Ovary and Fallopian Tube Tissue Ovary and Fallopian Tube, Left SURGICAL PATHOLOGY EXAM Winsome Gallardo MD 4/25/2024 11:10 AM    3 : Right Ovary and Fallopian Tube Tissue Ovary and Fallopian Tube, Right SURGICAL PATHOLOGY EXAM Winsome Gallardo MD 4/25/2024 11:10 AM    4 : endometrial polyp Tissue Uterus SURGICAL PATHOLOGY EXAM Winsome Gallardo MD 4/25/2024 11:35 AM      Findings:     Enlarged cystic right ovary  Normal right tube  Normal left tube and ovary  Normal uterus  Hemostasis of pedicles  Endometrial polyp  Normal tubal ostia bilaterally    Complications: None.    Implants: * No implants in log *    Winsome Gallardo MD, FACOG  (P) [phone]

## 2024-04-25 NOTE — CONSULTS
[facility] MEDICINE CONSULT NOTE   Physician requesting consult: Winsome Gallardo MD    Reason for consult: Postoperative medical management of medical co-morbidities as below    Assessment and Plan      84 year old female into [facility] on 4/25/2024 after presenting for en elective BSO hysteroscopy, dilation and curettage, polypectomy due to right ovarian cyst and thickened endometrium.     Pt had general anesthesia with minimal blood loss    HMS service was asked to evaluate patient for postoperative medical management as follows below. Please resume the home medications as reconciled and further noted below with ordered hold parameters.  Vital signs have been stable post-operatively including hemodynamically stable blood pressure and heart rate. Thank you for this consult; we will continue to follow this patient until discharge.      Problem list:    POD #0 BSO, hysteroscopy, dilation and curettage  HTN:  thazide 12.5 mg daily, valsartan 160 mg daily;  Resume both tomorrow at regular dose; valsartan  80 mg now (due to post op state);    3.  DM2, insulin dependent:  lantus 30 units twice daily   Resume tomorrow; lantus 20 units tonight   And bolus insulin if needed; most recent A1C was   9.2 on 4/15; consider outpatient diabetic ed  4.  Gout: history of, resume allopurinol 100 mg twice daily  5. Epilepsy:  resume lamictal tonight; 100 mg twice daily  6.  History of TIA, carotid disease:  resume baby aspirin when   Ok with OB/GYN  7.  Dvt prevention:  scd        -Reviewed the patient's preoperative H and P and updated missing elements.  -Home medication reconciliation has been reviewed.  Medications have been ordered as noted from the home list and changes are documented above     HISTORY         Past Medical History     Patient Active Problem List    Diagnosis Date Noted    Ovarian cyst 04/25/2024     Priority: Medium    Simple partial seizure disorder (H) 12/23/2021      Priority: Medium    Chronic kidney disease, stage 3a (H) 12/23/2021     Priority: Medium    Benign essential hypertension 12/23/2021     Priority: Medium    H/O Malignant melanoma 12/23/2021     Priority: Medium    Mixed hyperlipidemia 12/23/2021     Priority: Medium    Type 2 diabetes mellitus without complication (H) 12/23/2021     Priority: Medium    Acquired hypothyroidism 12/23/2021     Priority: Medium        Surgical History   She  has a past surgical history that includes IR Renal Angiogram Bilateral (11/26/2002); carotid endarterectomy; GYN surgery; Eye surgery; Cataract Extraction, Bilateral; and colonoscopy.     Past Surgical History:   Procedure Laterality Date    CAROTID ENDARTERECTOMY      CATARACT EXTRACTION, BILATERAL      COLONOSCOPY      EYE SURGERY      GYN SURGERY      IR RENAL ANGIOGRAM BILATERAL  11/26/2002       Family History    Reviewed, and family history includes Heart Disease in her father.    Social History    Reviewed, and she  reports that she has never smoked. She has never used smokeless tobacco. She reports that she does not drink alcohol and does not use drugs.  Social History     Tobacco Use    Smoking status: Never    Smokeless tobacco: Never   Substance Use Topics    Alcohol use: Never       Allergies     Allergies   Allergen Reactions    Glipizide      Other reaction(s): dizziness    Metformin Diarrhea       Prior to Admission Medications      Medications Prior to Admission   Medication Sig Dispense Refill Last Dose    allopurinol (ZYLOPRIM) 100 MG tablet Take 1 tablet by mouth 2 times daily   4/23/2024    aspirin (ASA) 81 MG chewable tablet Take 81 mg by mouth At Bedtime   4/20/2024    atorvastatin (LIPITOR) 40 MG tablet Take 40 mg by mouth at bedtime   4/23/2024    calcium carbonate-vitamin D (OSCAL) 500-5 MG-MCG tablet Take 1 tablet by mouth At Bedtime   4/20/2024    clindamycin (CLEOCIN T) 1 % external lotion Apply topically daily as needed (to face)   Unknown     glucosamine-chondroitin 500-400 MG CAPS per capsule Take 1 capsule by mouth At Bedtime   4/20/2024    hydrochlorothiazide (HYDRODIURIL) 25 MG tablet Take 12.5 mg by mouth daily   4/24/2024    ketoconazole (NIZORAL) 2 % external shampoo Apply topically daily as needed for itching or irritation   Unknown    lamoTRIgine (LAMICTAL) 100 MG tablet Take 1 tablet (100 mg) by mouth 2 times daily 180 tablet 3 4/25/2024 at AM    LANTUS SOLOSTAR 100 UNIT/ML soln Inject 30 Units Subcutaneous 2 times daily   4/24/2024 at PM took 15 units    levothyroxine (TIROSINT) 75 MCG capsule Take 75 mcg by mouth daily   4/24/2024 at AM    metroNIDAZOLE (METROCREAM) 0.75 % external cream Apply topically 2 times daily as needed (Rosecea)   Unknown    multivitamin w/minerals (THERA-VIT-M) tablet Take 1 tablet by mouth At Bedtime   4/20/2024    omeprazole (PRILOSEC) 20 MG DR capsule Take 20 mg by mouth daily   4/24/2024 at AM    valsartan (DIOVAN) 160 MG tablet Take 160 mg by mouth daily   4/24/2024 at AM       Review of Systems   A 12 point comprehensive review of systems was negative except as noted above.    OBJECTIVE         Physical Exam   Temp:  [97.1  F (36.2  C)-98.6  F (37  C)] 97.1  F (36.2  C)  Pulse:  [64-74] 67  Resp:  [16-25] 25  BP: (155-195)/(74-87) 169/76  SpO2:  [97 %-100 %] 98 %  There is no height or weight on file to calculate BMI.    GENERAL:  Alert, resting, no distress   HEAD:  Normocephalic, without obvious abnormality, atraumatic   EYES:  PERRL, conjunctiva/corneas clear, no scleral icterus, EOM's intact   NOSE: Nares normal, septum midline, mucosa normal, no drainage   THROAT: Lips, mucosa, and tongue normal; teeth and gums normal, mouth moist   NECK: Supple, symmetrical, trachea midline   BACK:   Symmetric, no curvature, ROM normal   LUNGS:   Clear to auscultation bilaterally, no rales, rhonchi, or wheezing, symmetric chest rise on inhalation, respirations unlabored   CHEST WALL:  No tenderness or deformity   HEART:   Regular rate and rhythm, S1 and S2 normal, no murmur, rub, or gallop    ABDOMEN:   Soft, non-tender, bowel sounds active all four quadrants, no masses, no organomegaly, no rebound or guarding   EXTREMITIES: Extremities normal, atraumatic, no cyanosis or edema    SKIN: Dry to touch, no exanthems in the visualized areas   NEURO: Alert, oriented x 4, moves all four extremities freely/spontaneously   PSYCH: Cooperative, behavior is appropriate         Imaging Reviewed Personally By Myself    Radiology Results: No results found for this or any previous visit (from the past 24 hour(s)).    Labs Reviewed Personally By Myself     Results for orders placed or performed during the hospital encounter of 04/25/24 (from the past 24 hour(s))   Glucose by meter   Result Value Ref Range    GLUCOSE BY METER POCT 131 (H) 70 - 99 mg/dL   Glucose by meter   Result Value Ref Range    GLUCOSE BY METER POCT 100 (H) 70 - 99 mg/dL       Preoperative Labs Reviewed Personally By Myself     Thank you for this consultation.  Appreciate the opportunity to participate in the care of Anjelica Rajan, please feel free to contact us for any questions or concerns.    Malia Fernando MD  [facility]  [facility]  Phone: #[phone]

## 2024-04-25 NOTE — ANESTHESIA CARE TRANSFER NOTE
Patient: Anjelica Rajan    Procedure: Procedure(s):  LAPAROSCOPIC BILATERAL SALPINGO-OOPHORECTOMY,  HYSTEROSCOPY, DILATION AND CURETTAGE, Polypectomy       Diagnosis: Cyst of ovary [N83.209]  Diagnosis Additional Information: No value filed.    Anesthesia Type:   General     Note:    Oropharynx: oropharynx clear of all foreign objects  Level of Consciousness: drowsy  Oxygen Supplementation: face mask  Level of Supplemental Oxygen (L/min / FiO2): 8  Independent Airway: airway patency satisfactory and stable  Dentition: dentition unchanged  Vital Signs Stable: post-procedure vital signs reviewed and stable  Report to RN Given: handoff report given  Patient transferred to: PACU    Handoff Report: Identifed the Patient, Identified the Reponsible Provider, Reviewed the pertinent medical history, Discussed the surgical course, Reviewed Intra-OP anesthesia mangement and issues during anesthesia, Set expectations for post-procedure period and Allowed opportunity for questions and acknowledgement of understanding      Vitals:  Vitals Value Taken Time   /76 04/25/24 1155   Temp 97.6f    Pulse 74 04/25/24 1155   Resp 20 04/25/24 1155   SpO2 100 % 04/25/24 1155       Electronically Signed By: MAJO HUSAIN CRNA  April 25, 2024  11:55 AM

## 2024-04-25 NOTE — PHARMACY-ADMISSION MEDICATION HISTORY
Pharmacist Admission Medication History    Admission medication history is complete. The information provided in this note is only as accurate as the sources available at the time of the update.    Information Source(s): Patient and CareEverywhere/SureScripts via in-person    Pertinent Information:   Allergies reviewed with patient and updates made in EHR: no    Medication History Completed By: Brittani Marin RPH 4/25/2024 8:07 AM    PTA Med List   Medication Sig Last Dose    allopurinol (ZYLOPRIM) 100 MG tablet Take 1 tablet by mouth 2 times daily 4/23/2024    aspirin (ASA) 81 MG chewable tablet Take 81 mg by mouth At Bedtime 4/20/2024    atorvastatin (LIPITOR) 40 MG tablet Take 40 mg by mouth at bedtime 4/23/2024    calcium carbonate-vitamin D (OSCAL) 500-5 MG-MCG tablet Take 1 tablet by mouth At Bedtime 4/20/2024    clindamycin (CLEOCIN T) 1 % external lotion Apply topically daily as needed (to face) Unknown    glucosamine-chondroitin 500-400 MG CAPS per capsule Take 1 capsule by mouth At Bedtime 4/20/2024    hydrochlorothiazide (HYDRODIURIL) 25 MG tablet Take 12.5 mg by mouth daily 4/24/2024    ketoconazole (NIZORAL) 2 % external shampoo Apply topically daily as needed for itching or irritation Unknown    lamoTRIgine (LAMICTAL) 100 MG tablet Take 1 tablet (100 mg) by mouth 2 times daily 4/25/2024 at AM    LANTUS SOLOSTAR 100 UNIT/ML soln Inject 30 Units Subcutaneous 2 times daily 4/24/2024 at PM took 15 units    levothyroxine (TIROSINT) 75 MCG capsule Take 75 mcg by mouth daily 4/24/2024 at AM    metroNIDAZOLE (METROCREAM) 0.75 % external cream Apply topically 2 times daily as needed (Rosecea) Unknown    multivitamin w/minerals (THERA-VIT-M) tablet Take 1 tablet by mouth At Bedtime 4/20/2024    omeprazole (PRILOSEC) 20 MG DR capsule Take 20 mg by mouth daily 4/24/2024 at AM    valsartan (DIOVAN) 160 MG tablet Take 160 mg by mouth daily 4/24/2024 at AM

## 2024-04-25 NOTE — ANESTHESIA PROCEDURE NOTES
Airway       Patient location during procedure: OR       Procedure Start/Stop Times: 4/25/2024 10:33 AM  Staff -        Anesthesiologist:  Eriberto Carter MD       CRNA: Yoana Perla APRN CRNA       Performed By: CRNA  Consent for Airway        Urgency: elective  Indications and Patient Condition       Indications for airway management: brandon-procedural and airway protection       Induction type:intravenous       Mask difficulty assessment: 2 - vent by mask + OA or adjuvant +/- NMBA    Final Airway Details       Final airway type: endotracheal airway       Successful airway: ETT - single  Endotracheal Airway Details        ETT size (mm): 7.0       Cuffed: yes       Successful intubation technique: direct laryngoscopy       Grade View of Cords: 1       Adjucts: stylet       Measured from: lips       Secured at (cm): 21       Bite block used: None    Post intubation assessment        Placement verified by: capnometry, equal breath sounds and chest rise        Number of attempts at approach: 1       Number of other approaches attempted: 0       Secured with: commercial tube hernandez and tape       Ease of procedure: easy       Dentition: Intact and Unchanged    Medication(s) Administered   Medication Administration Time: 4/25/2024 10:33 AM

## 2024-04-25 NOTE — ANESTHESIA POSTPROCEDURE EVALUATION
Patient: Anjelica Rajan    Procedure: Procedure(s):  LAPAROSCOPIC BILATERAL SALPINGO-OOPHORECTOMY,  HYSTEROSCOPY, DILATION AND CURETTAGE, Polypectomy       Anesthesia Type:  General    Note:  Disposition: Inpatient   Postop Pain Control: Uneventful            Sign Out: Well controlled pain   PONV: No   Neuro/Psych: Uneventful            Sign Out: Acceptable/Baseline neuro status   Airway/Respiratory: Uneventful            Sign Out: Acceptable/Baseline resp. status   CV/Hemodynamics: Uneventful            Sign Out: Acceptable CV status; No obvious hypovolemia; No obvious fluid overload   Other NRE: NONE   DID A NON-ROUTINE EVENT OCCUR? No           Last vitals:  Vitals Value Taken Time   /81 04/25/24 1300   Temp 36.2  C (97.1  F) 04/25/24 1249   Pulse 64 04/25/24 1305   Resp 29 04/25/24 1305   SpO2 96 % 04/25/24 1305   Vitals shown include unfiled device data.    Electronically Signed By: Eriberto Carter MD  April 25, 2024  2:16 PM

## 2024-04-26 VITALS
TEMPERATURE: 97.2 F | OXYGEN SATURATION: 96 % | SYSTOLIC BLOOD PRESSURE: 131 MMHG | HEART RATE: 64 BPM | DIASTOLIC BLOOD PRESSURE: 60 MMHG | RESPIRATION RATE: 18 BRPM

## 2024-04-26 LAB
GLUCOSE BLDC GLUCOMTR-MCNC: 169 MG/DL (ref 70–99)
GLUCOSE BLDC GLUCOMTR-MCNC: 84 MG/DL (ref 70–99)
PATH REPORT.COMMENTS IMP SPEC: NORMAL
PATH REPORT.FINAL DX SPEC: NORMAL
PATH REPORT.GROSS SPEC: NORMAL
PATH REPORT.RELEVANT HX SPEC: NORMAL
PHOTO IMAGE: NORMAL

## 2024-04-26 PROCEDURE — 88307 TISSUE EXAM BY PATHOLOGIST: CPT | Mod: 26 | Performed by: PATHOLOGY

## 2024-04-26 PROCEDURE — 82962 GLUCOSE BLOOD TEST: CPT

## 2024-04-26 PROCEDURE — 250N000013 HC RX MED GY IP 250 OP 250 PS 637: Performed by: EMERGENCY MEDICINE

## 2024-04-26 PROCEDURE — 250N000012 HC RX MED GY IP 250 OP 636 PS 637: Performed by: EMERGENCY MEDICINE

## 2024-04-26 PROCEDURE — 250N000013 HC RX MED GY IP 250 OP 250 PS 637: Performed by: OBSTETRICS & GYNECOLOGY

## 2024-04-26 PROCEDURE — 88305 TISSUE EXAM BY PATHOLOGIST: CPT | Mod: 26 | Performed by: PATHOLOGY

## 2024-04-26 RX ORDER — OXYCODONE HYDROCHLORIDE 5 MG/1
5-10 TABLET ORAL EVERY 4 HOURS PRN
Qty: 10 TABLET | Refills: 0 | Status: SHIPPED | OUTPATIENT
Start: 2024-04-26 | End: 2024-06-11

## 2024-04-26 RX ORDER — HYDROCHLOROTHIAZIDE 12.5 MG/1
12.5 CAPSULE ORAL DAILY
Status: DISCONTINUED | OUTPATIENT
Start: 2024-04-26 | End: 2024-04-26 | Stop reason: HOSPADM

## 2024-04-26 RX ORDER — AMOXICILLIN 250 MG
1-2 CAPSULE ORAL 2 TIMES DAILY
COMMUNITY
Start: 2024-04-26 | End: 2024-06-11

## 2024-04-26 RX ORDER — ACETAMINOPHEN 325 MG/1
650 TABLET ORAL EVERY 4 HOURS PRN
COMMUNITY
Start: 2024-04-26

## 2024-04-26 RX ADMIN — ACETAMINOPHEN 975 MG: 325 TABLET ORAL at 00:27

## 2024-04-26 RX ADMIN — INSULIN ASPART 1 UNITS: 100 INJECTION, SOLUTION INTRAVENOUS; SUBCUTANEOUS at 13:15

## 2024-04-26 RX ADMIN — ACETAMINOPHEN 975 MG: 325 TABLET ORAL at 05:27

## 2024-04-26 RX ADMIN — ALLOPURINOL 100 MG: 100 TABLET ORAL at 09:37

## 2024-04-26 RX ADMIN — ACETAMINOPHEN 975 MG: 325 TABLET ORAL at 11:01

## 2024-04-26 RX ADMIN — LEVOTHYROXINE SODIUM 75 MCG: 75 TABLET ORAL at 09:36

## 2024-04-26 RX ADMIN — PANTOPRAZOLE SODIUM 40 MG: 40 TABLET, DELAYED RELEASE ORAL at 06:36

## 2024-04-26 RX ADMIN — HYDROCHLOROTHIAZIDE 12.5 MG: 12.5 CAPSULE ORAL at 11:01

## 2024-04-26 RX ADMIN — LAMOTRIGINE 100 MG: 100 TABLET ORAL at 09:37

## 2024-04-26 RX ADMIN — VALSARTAN 160 MG: 80 TABLET, FILM COATED ORAL at 09:37

## 2024-04-26 RX ADMIN — SENNOSIDES AND DOCUSATE SODIUM 1 TABLET: 8.6; 5 TABLET ORAL at 09:36

## 2024-04-26 ASSESSMENT — ACTIVITIES OF DAILY LIVING (ADL)
ADLS_ACUITY_SCORE: 28

## 2024-04-26 NOTE — PROGRESS NOTES
"PRIMARY DIAGNOSIS: \"GENERIC\" NURSING  OUTPATIENT/OBSERVATION GOALS TO BE MET BEFORE DISCHARGE:  ADLs back to baseline: Yes    Activity and level of assistance: Up with standby assistance.    Pain status: Improved-controlled with oral pain medications.    Return to near baseline physical activity: Yes     Discharge Planner Nurse   Safe discharge environment identified: Yes  Barriers to discharge: Yes       Entered by: Marie Bishop RN 04/26/2024 1:41 PM     Please review provider order for any additional goals.   Nurse to notify provider when observation goals have been met and patient is ready for discharge.      "

## 2024-04-26 NOTE — DISCHARGE SUMMARY
HOSPITAL DISCHARGE SUMMARY - L/S removal of ovaries, hysteroscopy and D and C    NAME: Anjelica Rajan   : 1940    MRN: [id number]    PCP: [facility]    ADMISSION DATE:  2024  DISCHARGE DATE:  2024    REASON FOR ADMISSION: surgery and post op care    DIAGNOSIS:    1. Ovarian Cyst  2. Thickened endometrium  3. Medical history notable for HTN, DM, Gout, Epilepsy, CAD    CONSULTS:   Hospital medicine    HISTORY OF PRESENT ILLNESS AND HOSPITAL COURSE: Anjelica Rajan  is a 84 year old, female who underwent surgery as stated above without immediate complications . Postoperative course was unremarkable.  Patient denies headache, change in vision or upper abdominal pain.  On the day of discharge patient was tolerating diet, pain was controlled with oral medications, she was voiding and passing gas.    LABS:  Lab Results   Component Value Date    HGB 13.0 2024       EXAM:  Blood pressure 131/60, pulse 64, temperature 97.2  F (36.2  C), temperature source Oral, resp. rate 18, SpO2 96%.  General: NAD  Abdomen: Soft, non-tender  Incision: C/D/I with glue  Extremities: No pain, no edema    DISPOSITION:  Home    DISCHARGE CONDITION: Good/Stable    DISCHARGE MEDICATIONS:      Review of your medicines        START taking        Dose / Directions   acetaminophen 325 MG tablet  Commonly known as: TYLENOL      Dose: 650 mg  Take 2 tablets (650 mg) by mouth every 4 hours as needed for mild pain  Refills: 0     oxyCODONE 5 MG tablet  Commonly known as: ROXICODONE      Dose: 5-10 mg  Take 1-2 tablets (5-10 mg) by mouth every 4 hours as needed for moderate to severe pain  Quantity: 10 tablet  Refills: 0     senna-docusate 8.6-50 MG tablet  Commonly known as: SENOKOT-S/PERICOLACE      Dose: 1-2 tablet  Take 1-2 tablets by mouth 2 times daily  Refills: 0            CONTINUE these medicines which have NOT CHANGED        Dose / Directions   allopurinol 100 MG tablet  Commonly known as: ZYLOPRIM       Dose: 1 tablet  Take 1 tablet by mouth 2 times daily  Refills: 0     aspirin 81 MG chewable tablet  Commonly known as: ASA      Dose: 81 mg  Take 81 mg by mouth At Bedtime  Refills: 0     atorvastatin 40 MG tablet  Commonly known as: LIPITOR      Dose: 40 mg  Take 40 mg by mouth at bedtime  Refills: 0     calcium carbonate-vitamin D 500-5 MG-MCG tablet  Commonly known as: OSCAL      Dose: 1 tablet  Take 1 tablet by mouth At Bedtime  Refills: 0     clindamycin 1 % external lotion  Commonly known as: CLEOCIN T      Apply topically daily as needed (to face)  Refills: 0     glucosamine-chondroitin 500-400 MG Caps per capsule      Dose: 1 capsule  Take 1 capsule by mouth At Bedtime  Refills: 0     hydrochlorothiazide 25 MG tablet  Commonly known as: HYDRODIURIL      Dose: 12.5 mg  Take 12.5 mg by mouth daily  Refills: 0     ketoconazole 2 % external shampoo  Commonly known as: NIZORAL      Apply topically daily as needed for itching or irritation  Refills: 0     lamoTRIgine 100 MG tablet  Commonly known as: LaMICtal  Used for: Simple partial seizure disorder (H)      Dose: 100 mg  Take 1 tablet (100 mg) by mouth 2 times daily  Quantity: 180 tablet  Refills: 3     Lantus SoloStar 100 UNIT/ML soln  Generic drug: insulin glargine      Dose: 30 Units  Inject 30 Units Subcutaneous 2 times daily  Refills: 0     metroNIDAZOLE 0.75 % external cream  Commonly known as: METROCREAM      Apply topically 2 times daily as needed (Rosecea)  Refills: 0     multivitamin w/minerals tablet      Dose: 1 tablet  Take 1 tablet by mouth At Bedtime  Refills: 0     omeprazole 20 MG DR capsule  Commonly known as: PriLOSEC      Dose: 20 mg  Take 20 mg by mouth daily  Refills: 0     Tirosint 75 MCG capsule  Generic drug: levothyroxine      Dose: 75 mcg  Take 75 mcg by mouth daily  Refills: 0     valsartan 160 MG tablet  Commonly known as: DIOVAN      Dose: 160 mg  Take 160 mg by mouth daily  Refills: 0               Where to get your medicines         Some of these will need a paper prescription and others can be bought over the counter. Ask your nurse if you have questions.    Bring a paper prescription for each of these medications  oxyCODONE 5 MG tablet  You don't need a prescription for these medications  acetaminophen 325 MG tablet  senna-docusate 8.6-50 MG tablet           DISCHARGE PLAN:   - Follow up with  MD, in 1-2 weeks  - Take medication as prescribed  - Physical activity: As tolerated, no heavy lifting. Pelvic rest.  - Diet:  Regular  - Medication:  Please see MAR  - Warning signs discussed with patient about when to call the clinic/hospital  - All questions and concerns were answered for the patient prior to discharge.       Winsome Rubalcava MD FACOG  [facility]  [phone]      I saw the patient on the date of discharge  Total time spent for discharge on date of discharge: 20 minutes

## 2024-04-26 NOTE — PLAN OF CARE
Problem: Adult Inpatient Plan of Care  Goal: Optimal Comfort and Wellbeing  Intervention: Monitor Pain and Promote Comfort  Recent Flowsheet Documentation  Taken 4/26/2024 0022 by Nick Allen RN  Pain Management Interventions: medication (see MAR)   Goal Outcome Evaluation:       Pt is alert and oriented X 4, VSS, surgical site is C/D/I. Up with SBA to bathroom. Mild/moderate pain, managed with scheduled Tylenol. Tolerating full liquid diet, advanced to regular diet this shift. Calls appropriately. Possible discharge home today.

## 2024-04-26 NOTE — PROGRESS NOTES
Pt A&Ox 4. VSS on RA. Surgical site c/d/i. Voiding adequately. Up with sba to bathroom. Pain managed with PRN iv dilaudid x1 and oral dilaudid. IV SL. Tolerating clear liquids, advanced to full liquid this shift, passing gas. Discharge home tomorrow pending.

## 2024-04-29 LAB
PATH REPORT.COMMENTS IMP SPEC: NORMAL
PATH REPORT.COMMENTS IMP SPEC: NORMAL
PATH REPORT.FINAL DX SPEC: NORMAL
PATH REPORT.GROSS SPEC: NORMAL

## 2024-04-29 PROCEDURE — 88112 CYTOPATH CELL ENHANCE TECH: CPT | Mod: 26 | Performed by: PATHOLOGY

## 2024-05-05 NOTE — PROGRESS NOTES
[facility]  [place]    Anjelica Rajan MRN# [id number]   Age: 80 year old YOB: 1940               Assessment and Plan:   Assessment:   Right temporal lobe epilepsy        Plan:     I renewed the lamotrigine through her mail order pharmacy electronically.  We will plan to meet again in a year, sooner if need be.             Chief Complaint/HPI:     I spoke to her niece on the telephone for a telephone visit with her permission today.  She is 80 now, she has a history of seizures coming from the right temporal lobe.  I think it is been at least 10 years since she had a seizure (episodes of left-sided numbness).  She continues on lamotrigine 100 mg twice a day, no side effects from that.  She does mention that it might make her a little more mellow, but it is not causing the fatigue that she had with Keppra previously.            Past Medical History:    has a past medical history of Seizures (H).          Past Surgical History:    has no past surgical history on file.          Social History:     Social History     Tobacco Use     Smoking status: Never Smoker     Smokeless tobacco: Never Used   Substance Use Topics     Alcohol use: Never     Frequency: Never             Family History:     Family History   Problem Relation Age of Onset     Heart Disease Father                 Allergies:     Allergies   Allergen Reactions     Metformin Diarrhea             Medications:     Current Outpatient Medications:      allopurinol (ZYLOPRIM) 100 MG tablet, Take 1 tablet by mouth 2 times daily, Disp: , Rfl:      ASPIRIN 81 PO, Take by mouth At Bedtime, Disp: , Rfl:      atorvastatin (LIPITOR) 40 MG tablet, Take 1 tablet by mouth daily, Disp: , Rfl:      CALCIUM-VITAMIN D PO, Take by mouth daily, Disp: , Rfl:      clindamycin (CLEOCIN T) 1 % external lotion, as needed, Disp: , Rfl:      DIOVAN 160 MG tablet, Take 1 tablet by mouth daily, Disp: , Rfl:      GLUCOSAMINE-CHONDROITIN PO, Take by mouth  daily, Disp: , Rfl:      hydrochlorothiazide (HYDRODIURIL) 25 MG tablet, Take 12.5 mg by mouth daily, Disp: , Rfl:      lamoTRIgine (LAMICTAL) 100 MG tablet, Take 1 tablet (100 mg) by mouth 2 times daily, Disp: 180 tablet, Rfl: 3     LANTUS SOLOSTAR 100 UNIT/ML soln, , Disp: , Rfl:      levothyroxine (SYNTHROID/LEVOTHROID) 75 MCG tablet, Take 75 mcg by mouth daily, Disp: , Rfl:      MULTIPLE VITAMIN PO, Take by mouth daily, Disp: , Rfl:      omeprazole (PRILOSEC) 20 MG DR capsule, Take 1 capsule by mouth daily, Disp: , Rfl:      triamcinolone (KENALOG) 0.1 % external cream, as needed, Disp: , Rfl:            Review of Systems:   No difficulty breathing or swallowing            Physical Exam:   Awake and alert with no aphasia no dysarthria  Speech is clear and coherent  Cranial nerves are fine  I do not see any focal or lateralized weakness or coordination difficulties in the arms  Gait looks steady here on the video.        13 minutes were spent on the phone today      Lai Goodrich MD          To get better and follow your care plan as instructed.

## 2024-05-14 ENCOUNTER — ANCILLARY PROCEDURE (OUTPATIENT)
Dept: MAMMOGRAPHY | Facility: HOSPITAL | Age: 84
End: 2024-05-14
Attending: PHYSICIAN ASSISTANT
Payer: MEDICARE

## 2024-05-14 DIAGNOSIS — Z12.31 VISIT FOR SCREENING MAMMOGRAM: ICD-10-CM

## 2024-05-14 PROCEDURE — 77063 BREAST TOMOSYNTHESIS BI: CPT

## 2024-06-10 NOTE — PROGRESS NOTES
Medication Therapy Management (MTM) Encounter    ASSESSMENT:                            Medication Adherence/Access: No issues identified    Diabetes:   Patient is not meeting A1c goal of < 8%.  Self monitoring of blood glucose is not at goal of post prandial < 180 mg/dL.  Patient would benefit from starting continuous glucose monitoring.       Hypertension   Stable.    Hyperlipidemia   Stable.    Hypothyroidism   Stable.    Knee Pain:   Stable.    PLAN:                            1. Start a Lactaid supplement before any meal that contains dairy (milk, cheese, ice cream, etc).    2.  Opal 3 continuous glucose monitor sensor at [facility].   Instructions:  1. The first hour after you place the sensor is the warm up period (black out period).  You will need to carry your blood glucose meter and check blood glucose manually during this time.    2. Check blood sugar if feeling symptoms of low blood sugar, but meter is not displaying a low number. There may be some variability between sensor and meter at times.    3. Change sensor every 14 days.    4. Read/scan blood sugars just once after put a new sensor on.    5. Watch trend arrows. These will let you know if blood sugars are rising, falling or stable at the time you check.    6. It is okay to shower, bathe, and swim (up to 3 feet deep for 30 minutes) with sensor. Do not get reader wet.    7. Remove the sensor if you need to have an MRI or CT scan.    8. Do not cover the sensor with extra adhesive (the small hole in the center of the sensor must remain uncovered).  If you have trouble with sensor falling off, these are approved products to help with sensor adhesion: Torbot Skin Tac, SKIN-PREP Protective Barrier Wipe and Mastisol Liquid Adhesive.        Follow-up: Return in 4 weeks (on 7/9/2024) for Blood sugar recheck, in person, at 1:30pm.    SUBJECTIVE/OBJECTIVE:                          Anjelica Rajan is a 84 year old female seen for an initial visit. She was  "referred to me from Luca Cohen      Reason for visit: Medication review.  Concerns about gas - worse after carbonated beverages and dairy     Allergies/ADRs: Reviewed in chart  Past Medical History: Reviewed in chart  Tobacco: She reports that she has never smoked. She has never used smokeless tobacco.  Alcohol: none    Medication Adherence/Access: no issues reported    Diabetes   Lantus 30units twice daily   Aspirin 81mg daily  Patient is not experiencing side effects.  Medication History:  Jardiance - reports she passed out    Blood sugar monitorinmg/dL fasting this morning and ranges from , 150mg/dL last night and skipped insulin last night; 325mg/dL the night before average in the evening around 200's  Current diabetes symptoms: none  Diet/Exercise: Diet Pepsi maybe one daily \"If I need for energy\", drinks water throughout the day     Urine Albumin:   Lab Results   Component Value Date    UMALCR 45.45 (H) 2023      Lab Results   Component Value Date    A1C 9.2 (H) 04/15/2024       Hypertension   Hydrochlorothiazide 25mg 1/2 tab daily  Valsartan 160mg daily  Patient reports no current medication side effects  Patient does not self-monitor blood pressure.       BP Readings from Last 3 Encounters:   24 (!) 140/70   24 131/60   24 (!) 168/84     Pulse Readings from Last 3 Encounters:   24 80   24 64   24 72       Hyperlipidemia   Atorvastatin 40mg daily  Patient reports no significant myalgias or other side effects.     Recent Labs   Lab Test 23  1136 22  1039   CHOL 140 158   HDL 49* 45*   LDL 53 82   TRIG 188* 156*       Hypothyroidism   Tirosint (liquid filled capsule) 75 mcg daily.   Patient is having the following symptoms: none.   TSH   Date Value Ref Range Status   2023 4.14 0.30 - 4.20 uIU/mL Final   2022 3.34 0.30 - 5.00 uIU/mL Final      Knee Pain:   Glucosamine Chondroitin daily - seems to work well    Today's Vitals: BP " (!) 140/70   Pulse 80   Wt 193 lb 6.4 oz (87.7 kg)   BMI 32.68 kg/m    ----------------      I spent 60 minutes with this patient today. All changes were made via collaborative practice agreement with Luca Cohen PA-C. A copy of the visit note was provided to the patient's provider(s).    A summary of these recommendations was given to the patient.    Estephanie Hooker, Pharm.D., [facility]  Medication Therapy Management Pharmacist  [phone]          Medication Therapy Recommendations  Type 2 diabetes mellitus with hyperglycemia, with long-term current use of insulin (H)    Rationale: Medication requires monitoring - Needs additional monitoring   Recommendation: Self-Monitoring - FreeStyle Opal 3 Sensor Misc   Status: Accepted per CPA

## 2024-06-11 ENCOUNTER — OFFICE VISIT (OUTPATIENT)
Dept: PHARMACY | Facility: PHYSICIAN GROUP | Age: 84
End: 2024-06-11
Payer: MEDICARE

## 2024-06-11 VITALS
HEART RATE: 80 BPM | DIASTOLIC BLOOD PRESSURE: 70 MMHG | BODY MASS INDEX: 32.68 KG/M2 | WEIGHT: 193.4 LBS | SYSTOLIC BLOOD PRESSURE: 140 MMHG

## 2024-06-11 DIAGNOSIS — E11.65 TYPE 2 DIABETES MELLITUS WITH HYPERGLYCEMIA, WITH LONG-TERM CURRENT USE OF INSULIN (H): Primary | ICD-10-CM

## 2024-06-11 DIAGNOSIS — I10 HYPERTENSION, ESSENTIAL: ICD-10-CM

## 2024-06-11 DIAGNOSIS — E78.2 MIXED HYPERLIPIDEMIA: ICD-10-CM

## 2024-06-11 DIAGNOSIS — E03.9 HYPOTHYROIDISM, UNSPECIFIED TYPE: ICD-10-CM

## 2024-06-11 DIAGNOSIS — R52 PAIN: ICD-10-CM

## 2024-06-11 DIAGNOSIS — Z79.4 TYPE 2 DIABETES MELLITUS WITH HYPERGLYCEMIA, WITH LONG-TERM CURRENT USE OF INSULIN (H): Primary | ICD-10-CM

## 2024-06-11 PROCEDURE — 99207 PR NO CHARGE LOS: CPT | Performed by: PHARMACIST

## 2024-06-11 NOTE — PATIENT INSTRUCTIONS
Recommendations from today's MT visit:                                                      1. Start a Lactaid supplement before any meal that contains dairy (milk, cheese, ice cream, etc).    2.  Opal 3 continuous glucose monitor sensor at [facility].   Instructions:  1. The first hour after you place the sensor is the warm up period (black out period).  You will need to carry your blood glucose meter and check blood glucose manually during this time.    2. Check blood sugar if feeling symptoms of low blood sugar, but meter is not displaying a low number. There may be some variability between sensor and meter at times.    3. Change sensor every 14 days.    4. Read/scan blood sugars just once after put a new sensor on.    5. Watch trend arrows. These will let you know if blood sugars are rising, falling or stable at the time you check.    6. It is okay to shower, bathe, and swim (up to 3 feet deep for 30 minutes) with sensor. Do not get reader wet.    7. Remove the sensor if you need to have an MRI or CT scan.    8. Do not cover the sensor with extra adhesive (the small hole in the center of the sensor must remain uncovered).  If you have trouble with sensor falling off, these are approved products to help with sensor adhesion: Torbot Skin Tac, SKIN-PREP Protective Barrier Wipe and Mastisol Liquid Adhesive.        Follow-up: Return in 4 weeks (on 7/9/2024) for Blood sugar recheck, in person, at 1:30pm.    It was great to speak with you today.  I value your experience and would be very thankful for your time with providing feedback on our clinic survey. You may receive a survey via email or text message in the next few days.     To schedule another MT appointment, please call the clinic directly or you may call the scheduling line at [phone].    My Clinical Pharmacist's contact information:                                                      Please feel free to contact me with any questions or concerns you  have.      Estephanie Hooker, Pharm.D., [facility]  Medication Therapy Management Pharmacist  [phone]

## 2024-07-09 ENCOUNTER — OFFICE VISIT (OUTPATIENT)
Dept: PHARMACY | Facility: PHYSICIAN GROUP | Age: 84
End: 2024-07-09

## 2024-07-09 VITALS — WEIGHT: 194.6 LBS | SYSTOLIC BLOOD PRESSURE: 130 MMHG | BODY MASS INDEX: 32.89 KG/M2 | DIASTOLIC BLOOD PRESSURE: 56 MMHG

## 2024-07-09 DIAGNOSIS — I10 HYPERTENSION, ESSENTIAL: ICD-10-CM

## 2024-07-09 DIAGNOSIS — E11.65 TYPE 2 DIABETES MELLITUS WITH HYPERGLYCEMIA, WITH LONG-TERM CURRENT USE OF INSULIN (H): Primary | ICD-10-CM

## 2024-07-09 DIAGNOSIS — Z79.4 TYPE 2 DIABETES MELLITUS WITH HYPERGLYCEMIA, WITH LONG-TERM CURRENT USE OF INSULIN (H): Primary | ICD-10-CM

## 2024-07-09 PROCEDURE — 99207 PR NO CHARGE LOS: CPT | Performed by: PHARMACIST

## 2024-07-09 RX ORDER — BLOOD-GLUCOSE SENSOR
1 EACH MISCELLANEOUS
COMMUNITY

## 2024-07-09 NOTE — PATIENT INSTRUCTIONS
1. We placed your Opal 3 continuous glucose sensor together today and paired it with your phone. You can now use your phone to check your blood sugar levels at any time. We will place the next sensor together in 2 weeks.      Instructions:  1. The first hour after you place the sensor is the warm up period (black out period).  You will need to carry your blood glucose meter and check blood glucose manually during this time.    2. Check blood sugar if feeling symptoms of low blood sugar, but meter is not displaying a low number. There may be some variability between sensor and meter at times.    3. Change sensor every 14 days.    4. Read/scan blood sugars just once after put a new sensor on.    5. Watch trend arrows. These will let you know if blood sugars are rising, falling or stable at the time you check.    6. It is okay to shower, bathe, and swim (up to 3 feet deep for 30 minutes) with sensor. Do not get reader wet.    7. Remove the sensor if you need to have an MRI or CT scan.    8. Do not cover the sensor with extra adhesive (the small hole in the center of the sensor must remain uncovered).  If you have trouble with sensor falling off, these are approved products to help with sensor adhesion: Torbot Skin Tac, SKIN-PREP Protective Barrier Wipe and Mastisol Liquid Adhesive.      Follow-up: Return in about 2 weeks (around 7/23/2024) for Medication dose check.    It was great to speak with you today.  I value your experience and would be very thankful for your time with providing feedback on our clinic survey. You may receive a survey via email or text message in the next few days.     To schedule another MT appointment, please call the clinic directly or you may call the scheduling line at [phone].    My Clinical Pharmacist's contact information:                                                      Please feel free to contact me with any questions or concerns you have.      Estephanie Hooker,  Pharm.D., [facility]  Medication Therapy Management Pharmacist  [phone]

## 2024-07-09 NOTE — PROGRESS NOTES
Medication Therapy Management (MTM) Encounter    ASSESSMENT:                            Medication Adherence/Access: No issues identified    Diabetes:   Patient is not meeting A1c goal of < 8%.  Self monitoring of blood glucose is not at goal of post prandial < 180 mg/dL.  Patient would benefit from starting continuous glucose monitoring.     Hypertension:   Stable. Patient is meeting blood pressure goal of < 140/90mmHg.      PLAN:                            1. We placed your Opal 3 continuous glucose sensor together today and paired it with your phone. You can now use your phone to check your blood sugar levels at any time. We will place the next sensor together in 2 weeks.      Instructions:  1. The first hour after you place the sensor is the warm up period (black out period).  You will need to carry your blood glucose meter and check blood glucose manually during this time.    2. Check blood sugar if feeling symptoms of low blood sugar, but meter is not displaying a low number. There may be some variability between sensor and meter at times.    3. Change sensor every 14 days.    4. Read/scan blood sugars just once after put a new sensor on.    5. Watch trend arrows. These will let you know if blood sugars are rising, falling or stable at the time you check.    6. It is okay to shower, bathe, and swim (up to 3 feet deep for 30 minutes) with sensor. Do not get reader wet.    7. Remove the sensor if you need to have an MRI or CT scan.    8. Do not cover the sensor with extra adhesive (the small hole in the center of the sensor must remain uncovered).  If you have trouble with sensor falling off, these are approved products to help with sensor adhesion: Torbot Skin Tac, SKIN-PREP Protective Barrier Wipe and Mastisol Liquid Adhesive.      Follow-up: Return in about 2 weeks (around 7/23/2024) for Medication dose check.    SUBJECTIVE/OBJECTIVE:                          Anjelica Rajan is a 84 year old female seen for an  "initial visit. She was referred to me from Luca Cohen.      Reason for visit: Medication review.  Concerns about gas - worse after carbonated beverages and dairy     Allergies/ADRs: Reviewed in chart  Past Medical History: Reviewed in chart  Tobacco: She reports that she has never smoked. She has never used smokeless tobacco.  Alcohol: none    Medication Adherence/Access: no issues reported    Diabetes   Lantus 30units twice daily   Aspirin 81mg daily  Patient is not experiencing side effects.  Medication History:  Jardiance - reports she passed out    Blood sugar monitoring: here today for Opal 3 sensor placement. We placed her first sensor together and she has the marilyn set up on her phone. She is connected to [employer]  Current diabetes symptoms: none  Diet/Exercise: Diet Pepsi maybe one daily \"If I need for energy\", drinks water throughout the day     Urine Albumin:   Lab Results   Component Value Date    UMALCR 45.45 (H) 09/05/2023      Lab Results   Component Value Date    A1C 9.2 (H) 04/15/2024       Hypertension   Hydrochlorothiazide 25mg 1/2 tab daily  Valsartan 160mg daily  Patient reports no current medication side effects  Patient does not self-monitor blood pressure.       BP Readings from Last 3 Encounters:   07/09/24 130/56   06/11/24 (!) 140/70   04/26/24 131/60     Pulse Readings from Last 3 Encounters:   06/11/24 80   04/26/24 64   03/19/24 72       Today's Vitals: /56   Wt 194 lb 9.6 oz (88.3 kg)   BMI 32.89 kg/m    ----------------      I spent 60 minutes with this patient today. All changes were made via collaborative practice agreement with Luca Cohen PA-C. A copy of the visit note was provided to the patient's provider(s).    A summary of these recommendations was given to the patient.    Estephanie Hooker, Pharm.D., [facility]  Medication Therapy Management Pharmacist  [phone]          Medication Therapy Recommendations  Type 2 diabetes mellitus with hyperglycemia, with " long-term current use of insulin (H)    Current Medication: Continuous Glucose Sensor (FREESTYLE MOON 3 SENSOR) MISC   Rationale: Does not understand instructions - Adherence - Adherence   Recommendation: Provide Education   Status: Patient Agreed - Adherence/Education

## 2024-07-23 ENCOUNTER — OFFICE VISIT (OUTPATIENT)
Dept: PHARMACY | Facility: PHYSICIAN GROUP | Age: 84
End: 2024-07-23
Payer: OTHER GOVERNMENT

## 2024-07-23 DIAGNOSIS — Z79.4 TYPE 2 DIABETES MELLITUS WITH HYPERGLYCEMIA, WITH LONG-TERM CURRENT USE OF INSULIN (H): Primary | ICD-10-CM

## 2024-07-23 DIAGNOSIS — E11.65 TYPE 2 DIABETES MELLITUS WITH HYPERGLYCEMIA, WITH LONG-TERM CURRENT USE OF INSULIN (H): Primary | ICD-10-CM

## 2024-07-23 PROCEDURE — 99207 PR NO CHARGE LOS: CPT | Performed by: PHARMACIST

## 2024-07-23 NOTE — PROGRESS NOTES
"Medication Therapy Management (MTM) Encounter    ASSESSMENT:                            Medication Adherence/Access: No issues identified    Diabetes:   Patient is not meeting A1c goal of < 8%.  Patient is not meeting goal of > 70% time in target with continuous glucose monitoring.   Patient would benefit from improved compliance with insulin and lifestyle changes. Given she has not been taking full dose of insulin for sometime, will have her restart with 30units just once daily, and plan to titrate as needed. May also need to consider pre-mixed insulin with meals at a future visit if post-prandial blood sugars remain elevated.    PLAN:                            1. Take Lantus 30units at bedtime (once a day) every night, no matter what your blood sugar level is.    2. Aim for well balanced meals and snacks with protein (lean meats, seafood, eggs), healthy fats (olive oil and olives, coconut oil, avacado, nuts and nut butters, seeds, dairy as tolerated) and carbohydrates mostly from fruits and vegetables.      Follow-up: Return in 3 weeks (on 8/13/2024) for Blood sugar recheck, Medication dose check.    SUBJECTIVE/OBJECTIVE:                          Anjelica Rajan is a 84 year old female seen for a follow-up visit. She was referred to me from Luca Cohen.      Reason for visit: Medication review.    Allergies/ADRs: Reviewed in chart  Past Medical History: Reviewed in chart  Tobacco: She reports that she has never smoked. She has never used smokeless tobacco.  Alcohol: none    Medication Adherence/Access: no issues reported    Diabetes   Lantus 30units twice daily - has been skipping frequently. Will take 30units in the morning if fasting reading is \"high\", if \"low\" will take 15units. Has been forgetting evening dose often  Aspirin 81mg daily  Patient is not experiencing side effects.  Medication History:  Jardiance - reports she passed out    Blood sugar monitoring: Started Opal 3 last visit, here today for " "Opal 3 sensor replacement. She is connected to [employer]  Current diabetes symptoms: none  Diet/Exercise: Diet Pepsi maybe one daily \"If I need for energy\", drinks water throughout the day  Breakfast: 2 toast with butter or margarine, coffee with sugar, sometimes an egg  Lunch/Supper: [place] with chips  Supper: Hamburger, potatoes    Name: Anjelica Rajan  YOB: 1940  Report Period: 07/10/2024 - 07/23/2024 (14 days)  Generated: 07/23/2024  % Time CGM Active: 97%    Glucose Statistics and Targets  Average Glucose: 164 mg/dL  Glucose Management Indicator (GMI): 7.2%  Glucose Variability (%CV): 26.6%  Target Range: 70 - 180 mg/dL    Time in Ranges  Very High: >250 mg/dL --- 2%  High: 181 - 250 mg/dL --- 34%  Target Range: 70 - 180 mg/dL --- 64%  Low: 54 - 69 mg/dL --- 0%  Very Low: <54 mg/dL --- 0%       Urine Albumin:   Lab Results   Component Value Date    UMALCR 45.45 (H) 09/05/2023      Lab Results   Component Value Date    A1C 9.2 (H) 04/15/2024         Today's Vitals: There were no vitals taken for this visit.  ----------------      I spent 30 minutes with this patient today. All changes were made via collaborative practice agreement with Luca Cohen PA-C. A copy of the visit note was provided to the patient's provider(s).    A summary of these recommendations was given to the patient.    Estephanie Hooker, Pharm.D., BCACP  Medication Therapy Management Pharmacist  [phone]          Medication Therapy Recommendations  Type 2 diabetes mellitus with hyperglycemia, with long-term current use of insulin (H)    Current Medication: LANTUS SOLOSTAR 100 UNIT/ML soln   Rationale: Patient forgets to take - Adherence - Adherence   Recommendation: Provide Adherence Intervention   Status: Accepted per CPA                "

## 2024-07-23 NOTE — PATIENT INSTRUCTIONS
Recommendations from today's MTM visit:                                                      1. Take Lantus 30units at bedtime (once a day) every night, no matter what your blood sugar level is.    2. Aim for well balanced meals and snacks with protein (lean meats, seafood, eggs), healthy fats (olive oil and olives, coconut oil, avacado, nuts and nut butters, seeds, dairy as tolerated) and carbohydrates mostly from fruits and vegetables.      Follow-up: Return in 3 weeks (on 8/13/2024) for Blood sugar recheck, Medication dose check.    It was great to speak with you today.  I value your experience and would be very thankful for your time with providing feedback on our clinic survey. You may receive a survey via email or text message in the next few days.     To schedule another MTM appointment, please call the clinic directly or you may call the scheduling line at [phone].    My Clinical Pharmacist's contact information:                                                      Please feel free to contact me with any questions or concerns you have.      Estephanie Hooker, Pharm.D., [facility]  Medication Therapy Management Pharmacist  [phone]

## 2024-07-24 ENCOUNTER — TRANSFERRED RECORDS (OUTPATIENT)
Dept: HEALTH INFORMATION MANAGEMENT | Facility: CLINIC | Age: 84
End: 2024-07-24
Payer: MEDICARE

## 2024-07-24 LAB — HBA1C MFR BLD: 7.8 % (ref 4.2–6.1)

## 2024-08-09 NOTE — PROGRESS NOTES
Medication Therapy Management (MTM) Encounter    ASSESSMENT:                            Medication Adherence/Access: No issues identified    Diabetes:   Patient is meeting A1c goal of < 8%.  Patient is not meeting goal of > 70% time in target with continuous glucose monitoring. Worsened on lower dose of Lantus, but running particularly high post-prandially. She may benefit from switching to pre-mixed insulin to provide both long acting insulin and to target post-prandial blood sugar.      PLAN:                            1. Stop the Lantus insulin at bedtime.    2. Start Novolog 70/30 premixed insulin 15units every morning before breakfast (or first meal of the day) and 15units before dinner.     3. Keep a close eye on blood sugar levels.  .Hypoglycemia or low blood sugar means: blood sugar levels below 70mg/dL, which may also include feelings of shakiness, racing heart, sweating, nervousness, dizziness, irritability, being hungry, impaired vision, weakness, fatigue, headache.    What to do:  - If mealtime, eat immediately  - If not mealtime, have 15grams of carbohydrates (3 glucose tablets, 1 cup juice or non-diet soda, 3 tsps table sugar, 3 tsps honey)  - Retest blood sugar in 10-20 minutes.  If blood sugar is still below 70mg/dL, have another 15grams of carbohydrates and re-test every 10-20 minutes until hypoglycemia resolves    If this is a one time thing or happens very rarely, we can continue your current medications. However, if this starts to happen regularly (several times a week), we will need to REDUCE your medications.        Follow-up: Return in 30 days (on 9/12/2024) for Blood sugar recheck, at 11:30pm.    SUBJECTIVE/OBJECTIVE:                          Anjelica Rajan is a 84 year old female seen for a follow-up visit.     Reason for visit: Medication review.    Allergies/ADRs: Reviewed in chart  Past Medical History: Reviewed in chart  Tobacco: She reports that she has never smoked. She has never used  "smokeless tobacco.  Alcohol: none    Medication Adherence/Access: no issues reported    Diabetes   Lantus 30units once daily QPM- reduced from twice daily last visit due to missing frequently; reports has been consistent with taking it  Aspirin 81mg daily  Patient is not experiencing side effects.  Medication History:  Jardiance - reports she passed out    Blood sugar monitoring: Opal 3, see below  Current diabetes symptoms: none  Diet/Exercise: Diet Pepsi maybe one daily \"If I need for energy\", drinks water throughout the day  Breakfast: 2 toast with butter or margarine, coffee with sugar, sometimes an egg  Lunch/Supper: [place] with chips  Supper: Hamburger, potatoes  Potato chips last night while watching TV  This morning a [employer] bar    Name: Anjelica Rajan  YOB: 1940  Report Period: 07/31/2024 - 08/13/2024 (14 days)  Generated: 08/13/2024  % Time CGM Active: 95%      Glucose Statistics and Targets  Average Glucose: 190 mg/dL  Glucose Management Indicator (GMI): 7.9%  Glucose Variability (%CV): 28.3%  Target Range: 70 - 180 mg/dL      Time in Ranges  Very High: >250 mg/dL --- 16%  High: 181 - 250 mg/dL --- 35%  Target Range: 70 - 180 mg/dL --- 49% - worsened from 64% last visit  Low: 54 - 69 mg/dL --- 0%  Very Low: <54 mg/dL --- 0%      Abstracted 08/13/24      Urine Albumin:   Lab Results   Component Value Date    UMALCR 45.45 (H) 09/05/2023      Lab Results   Component Value Date    A1C 9.2 (H) 04/15/2024         Today's Vitals: Wt 188 lb (85.3 kg)   BMI 31.77 kg/m    ----------------      I spent 30 minutes with this patient today. All changes were made via collaborative practice agreement with Luca Cohen PA-C. A copy of the visit note was provided to the patient's provider(s).    A summary of these recommendations was given to the patient.    Estephanie Hooker, Simon.D., [facility]  Medication Therapy Management Pharmacist  [phone]          Medication Therapy Recommendations  Type " 2 diabetes mellitus with hyperglycemia, with long-term current use of insulin (H)    Current Medication: LANTUS SOLOSTAR 100 UNIT/ML soln (Discontinued)   Rationale: Condition refractory to medication - Ineffective medication - Effectiveness   Recommendation: Change Medication - NovoLOG MIX 70/30 FLEXPEN (70-30) 100 UNIT/ML susp   Status: Accepted per CPA

## 2024-08-13 ENCOUNTER — OFFICE VISIT (OUTPATIENT)
Dept: PHARMACY | Facility: PHYSICIAN GROUP | Age: 84
End: 2024-08-13

## 2024-08-13 VITALS — WEIGHT: 188 LBS | BODY MASS INDEX: 31.77 KG/M2

## 2024-08-13 DIAGNOSIS — Z79.4 TYPE 2 DIABETES MELLITUS WITH HYPERGLYCEMIA, WITH LONG-TERM CURRENT USE OF INSULIN (H): Primary | ICD-10-CM

## 2024-08-13 DIAGNOSIS — E11.65 TYPE 2 DIABETES MELLITUS WITH HYPERGLYCEMIA, WITH LONG-TERM CURRENT USE OF INSULIN (H): Primary | ICD-10-CM

## 2024-08-13 PROCEDURE — 99207 PR NO CHARGE LOS: CPT | Performed by: PHARMACIST

## 2024-08-13 RX ORDER — INSULIN ASPART 100 [IU]/ML
15 INJECTION, SUSPENSION SUBCUTANEOUS 2 TIMES DAILY WITH MEALS
COMMUNITY

## 2024-08-13 NOTE — PATIENT INSTRUCTIONS
Recommendations from today's MTM visit:                                                      1. Stop the Lantus insulin at bedtime.    2. Start Novolog 70/30 premixed insulin 15units every morning before breakfast (or first meal of the day) and 15units before dinner.     3. Keep a close eye on blood sugar levels.  .Hypoglycemia or low blood sugar means: blood sugar levels below 70mg/dL, which may also include feelings of shakiness, racing heart, sweating, nervousness, dizziness, irritability, being hungry, impaired vision, weakness, fatigue, headache.    What to do:  - If mealtime, eat immediately  - If not mealtime, have 15grams of carbohydrates (3 glucose tablets, 1 cup juice or non-diet soda, 3 tsps table sugar, 3 tsps honey)  - Retest blood sugar in 10-20 minutes.  If blood sugar is still below 70mg/dL, have another 15grams of carbohydrates and re-test every 10-20 minutes until hypoglycemia resolves    If this is a one time thing or happens very rarely, we can continue your current medications. However, if this starts to happen regularly (several times a week), we will need to REDUCE your medications.        Follow-up: Return in 30 days (on 9/12/2024) for Blood sugar recheck, at 11:30pm.    It was great to speak with you today.  I value your experience and would be very thankful for your time with providing feedback on our clinic survey. You may receive a survey via email or text message in the next few days.     To schedule another MTM appointment, please call the clinic directly or you may call the scheduling line at [phone].    My Clinical Pharmacist's contact information:                                                      Please feel free to contact me with any questions or concerns you have.      Estephanie Hooker, Pharm.D., [facility]  Medication Therapy Management Pharmacist  [phone]

## 2024-12-23 DIAGNOSIS — G40.109 SIMPLE PARTIAL SEIZURE DISORDER (H): ICD-10-CM

## 2024-12-23 NOTE — TELEPHONE ENCOUNTER
Refill request for: lamoTRIgine (LAMICTAL) 100 MG tablet    Directions: Take 1 tablet (100 mg) by mouth 2 times daily     LOV: 12/27/23  NOV: 12/27/24    90 day supply with 0 refills Medication T'd for review and signature  Sita WICK CMA on 12/23/2024 at 4:15 PM  [facility]

## 2024-12-24 PROBLEM — G45.9 TRANSIENT ISCHEMIC ATTACK: Status: ACTIVE | Noted: 2024-12-24

## 2024-12-24 RX ORDER — LAMOTRIGINE 100 MG/1
100 TABLET ORAL 2 TIMES DAILY
Qty: 180 TABLET | Refills: 0 | Status: SHIPPED | OUTPATIENT
Start: 2024-12-24

## 2025-06-26 ENCOUNTER — ANCILLARY PROCEDURE (OUTPATIENT)
Dept: MAMMOGRAPHY | Facility: CLINIC | Age: 85
End: 2025-06-26
Attending: PHYSICIAN ASSISTANT
Payer: MEDICARE

## 2025-06-26 DIAGNOSIS — Z12.31 VISIT FOR SCREENING MAMMOGRAM: ICD-10-CM

## 2025-06-26 PROCEDURE — 77063 BREAST TOMOSYNTHESIS BI: CPT

## (undated) DEVICE — SUCTION MANIFOLD NEPTUNE 2 SYS 1 PORT 702-025-000

## (undated) DEVICE — ENDO TROCAR FIRST ENTRY KII FIOS Z-THRD 11X100MM CTF33

## (undated) DEVICE — ESU LIGASURE ATLAS 10MM  LS1037

## (undated) DEVICE — Device

## (undated) DEVICE — TUBING FILTER TRI-LUMEN AIRSEAL ASC-EVAC1

## (undated) DEVICE — KIT PROCEDURE FLUENT IN/OUT FLOWPAK TISS TRAP FLT-112S

## (undated) DEVICE — GLOVE PI ULTRATCH M LF SZ 6.5 PF CUFF TEXT STRL LF 42665

## (undated) DEVICE — ELECTRODE PATIENT RETURN ADULT L10 FT 2 PLATE CORD 0855C

## (undated) DEVICE — SU DERMABOND ADVANCED .7ML DNX12

## (undated) DEVICE — ENDO POUCH UNIVERSAL RETRIEVAL SYSTEM INZII 5MM CD003

## (undated) DEVICE — PREP CHLORAPREP 26ML TINTED HI-LITE ORANGE 930815

## (undated) DEVICE — DECANTER VIAL 2006S

## (undated) DEVICE — ENDO TROCAR SLEEVE KII Z-THREADED 05X100MM CTS02

## (undated) DEVICE — SOL WATER IRRIG 1000ML BOTTLE 2F7114

## (undated) DEVICE — DRSG TELFA 3X4" 1050

## (undated) DEVICE — TROCAR PORT BLADELESS 5X100MM IAS5-100LP

## (undated) DEVICE — GLOVE SURG PI ULTRA TOUCH M SZ 7 LF 42670

## (undated) DEVICE — ENDO SHEARS RENEW LAP ENDOCUT SCISSOR TIP 16.5MM 3142

## (undated) DEVICE — UTERINE MANIPULATOR RUMI 5.1MMX6CM UML516

## (undated) DEVICE — GOWN LG DISP 9515

## (undated) DEVICE — PAD POS XL 1X20X40IN PINK PIGAZZI

## (undated) DEVICE — SYR 50ML SLIP TIP W/O NDL 309654

## (undated) DEVICE — JELLY LUBRICATING SURGILUBE 2OZ TUBE

## (undated) DEVICE — GLOVE BIOGEL PI ULTRATOUCH G SZ 6.0 42160

## (undated) DEVICE — PROTECTOR ARM STANDARD ONE STEP

## (undated) DEVICE — CUSTOM PACK DA VINCI GYN SMA5BDVHEA

## (undated) DEVICE — ENDO TROCAR FIRST ENTRY KII FIOS Z-THRD 05X100MM CTF03

## (undated) DEVICE — DRAPE U SPLIT 74X120" 29440

## (undated) DEVICE — BRIEF STRETCH XL MPS40

## (undated) DEVICE — CATH FOLEY 16FR 5ML LUBRICATH LATEX 0165L16

## (undated) DEVICE — SUCTION STRYKERFLOW II 250-070-500

## (undated) DEVICE — BLADE KNIFE SURG 15 371115

## (undated) DEVICE — PACK MINOR SINGLE BASIN SSK3001

## (undated) DEVICE — SU VICRYL+ 0 27 UR6 VLT VCP603H

## (undated) DEVICE — SUTURE ENDO SURGITIE 21 POLYSORB EL23LN

## (undated) DEVICE — DAVINCI HOT SHEARS TIP COVER  400180

## (undated) DEVICE — NDL INSUFFLATION 13GA 120MM C2201

## (undated) DEVICE — MAT FLOOR SURGICAL 40X38 0702140238

## (undated) DEVICE — GLOVE UNDER INDICATOR PI SZ 6 LF 41660

## (undated) DEVICE — TRAP SPECIMAN 5CC-40CC DYND44140

## (undated) DEVICE — ANTIFOG SOLUTION SEE SHARP 150M TROCAR SWABS 30978

## (undated) DEVICE — SEAL SET MYOSURE ROD LENS SCOPE SINGLE USE 40-902

## (undated) DEVICE — LUBRICANT INST ELECTROLUBE EL101

## (undated) DEVICE — SOLUTION IRRIG 2B7127 .9NS 3000ML BAG

## (undated) RX ORDER — PROPOFOL 10 MG/ML
INJECTION, EMULSION INTRAVENOUS
Status: DISPENSED
Start: 2024-04-25

## (undated) RX ORDER — ONDANSETRON 2 MG/ML
INJECTION INTRAMUSCULAR; INTRAVENOUS
Status: DISPENSED
Start: 2024-04-25

## (undated) RX ORDER — LIDOCAINE HYDROCHLORIDE 10 MG/ML
INJECTION, SOLUTION EPIDURAL; INFILTRATION; INTRACAUDAL; PERINEURAL
Status: DISPENSED
Start: 2024-04-25

## (undated) RX ORDER — FENTANYL CITRATE-0.9 % NACL/PF 10 MCG/ML
PLASTIC BAG, INJECTION (ML) INTRAVENOUS
Status: DISPENSED
Start: 2024-04-25

## (undated) RX ORDER — FENTANYL CITRATE 50 UG/ML
INJECTION, SOLUTION INTRAMUSCULAR; INTRAVENOUS
Status: DISPENSED
Start: 2024-04-25